# Patient Record
Sex: MALE | Race: WHITE | NOT HISPANIC OR LATINO | Employment: OTHER | URBAN - METROPOLITAN AREA
[De-identification: names, ages, dates, MRNs, and addresses within clinical notes are randomized per-mention and may not be internally consistent; named-entity substitution may affect disease eponyms.]

---

## 2020-03-09 ENCOUNTER — OFFICE VISIT (OUTPATIENT)
Dept: DERMATOLOGY | Facility: CLINIC | Age: 85
End: 2020-03-09
Payer: COMMERCIAL

## 2020-03-09 VITALS
HEART RATE: 86 BPM | OXYGEN SATURATION: 98 % | SYSTOLIC BLOOD PRESSURE: 171 MMHG | DIASTOLIC BLOOD PRESSURE: 94 MMHG | RESPIRATION RATE: 16 BRPM

## 2020-03-09 DIAGNOSIS — L81.4 LENTIGO: ICD-10-CM

## 2020-03-09 DIAGNOSIS — M67.449 DIGITAL MUCOUS CYST: ICD-10-CM

## 2020-03-09 DIAGNOSIS — L82.1 SEBORRHEIC KERATOSIS: Primary | ICD-10-CM

## 2020-03-09 PROCEDURE — 99203 OFFICE O/P NEW LOW 30 MIN: CPT | Performed by: DERMATOLOGY

## 2020-03-09 RX ORDER — LEVOCETIRIZINE DIHYDROCHLORIDE 5 MG/1
5 TABLET, FILM COATED ORAL DAILY
COMMUNITY
Start: 2018-08-29

## 2020-03-09 NOTE — NURSING NOTE
Initial BP (!) 171/94 (BP Location: Right arm, Patient Position: Sitting, Cuff Size: Adult Large)   Pulse 86   Resp 16   SpO2 98%  There is no height or weight on file to calculate BMI. .    Licha Ortega, CMA

## 2020-03-09 NOTE — LETTER
3/9/2020         RE: Tom Summers  [address] [place] [address]        Dear Colleague,    Thank you for referring your patient, Tom Summers, to the [facility]. Please see a copy of my visit note below.    Tom Summers is a 85 year old year old male patient here today for spots on arms and spot on right middle finger.   .  Patient states this has been present for a while.  Patient reports the following symptoms:  drianing clear fluid now gone.  .  Patient reports the following previous treatments none.  These treatments did not work.  Patient reports the following modifying factors none.  Associated symptoms: none.  Patient has no other skin complaints today.  Remainder of the HPI, Meds, PMH, Allergies, FH, and SH was reviewed in chart.    No past medical history on file.    No past surgical history on file.     No family history on file.    Social History     Socioeconomic History     Marital status:      Spouse name: Not on file     Number of children: Not on file     Years of education: Not on file     Highest education level: Not on file   Occupational History     Not on file   Social Needs     Financial resource strain: Not on file     Food insecurity     Worry: Not on file     Inability: Not on file     Transportation needs     Medical: Not on file     Non-medical: Not on file   Tobacco Use     Smoking status: Never Smoker     Smokeless tobacco: Never Used   Substance and Sexual Activity     Alcohol use: Not on file     Drug use: Not on file     Sexual activity: Not on file   Lifestyle     Physical activity     Days per week: Not on file     Minutes per session: Not on file     Stress: Not on file   Relationships     Social connections     Talks on phone: Not on file     Gets together: Not on file     Attends [employer] service: Not on file     Active member of club or organization: Not on file     Attends meetings of clubs or organizations: Not on file      Relationship status: Not on file     Intimate partner violence     Fear of current or ex partner: Not on file     Emotionally abused: Not on file     Physically abused: Not on file     Forced sexual activity: Not on file   Other Topics Concern     Not on file   Social History Narrative     Not on file       Outpatient Encounter Medications as of 3/9/2020   Medication Sig Dispense Refill     levocetirizine (XYZAL) 5 MG tablet Take 5 mg by mouth       No facility-administered encounter medications on file as of 3/9/2020.              Review Of Systems  Skin: As above  Eyes: negative  Ears/Nose/Throat: negative  Respiratory: No shortness of breath, dyspnea on exertion, cough, or hemoptysis  Cardiovascular: negative  Gastrointestinal: negative  Genitourinary: negative  Musculoskeletal: negative  Neurologic: negative  Psychiatric: negative  Hematologic/Lymphatic/Immunologic: negative  Endocrine: negative      O:   NAD, WDWN, Alert & Oriented, Mood & Affect wnl, Vitals stable   Here today alone   BP (!) 171/94 (BP Location: Right arm, Patient Position: Sitting, Cuff Size: Adult Large)   Pulse 86   Resp 16   SpO2 98%    General appearance normal   Vitals stable   Alert, oriented and in no acute distress        Stuck on papules and brown macules on trunk and ext and arms   R middle finger no visable lesions today        The remainder of expanded problem focused exam was normal ; the following areas were examined:  scalp/hair, conjunctiva/lids, face, neck, lips/teeth, chest, digits/nails, RUE, LUE.      Eyes: Conjunctivae/lids:Normal     ENT: Lips, buccal mucosa, tongue: normal    MSK:Normal    Cardiovascular: peripheral edema none    Pulm: Breathing Normal    Neuro/Psych: Orientation:Alert and Orientedx3 ; Mood/Affect:normal       A/P:  1. Seborrheic keratosis, lentigo,   2. Favor digital mucous cyst  Pathophysiology discussed with pateint and information provided   If recurs return to clinic for excision     BENIGN  LESIONS DISCUSSED WITH PATIENT:  I discussed the specifics of tumor, prognosis, and genetics of benign lesions.  I explained that treatment of these lesions would be purely cosmetic and not medically neccessary.  I discussed with patient different removal options including excision, cautery and /or laser.      Nature and genetics of benign skin lesions dicussed with patient.  Signs and Symptoms of skin cancer discussed with patient.  Patient encouraged to perform monthly skin exams.  UV precautions reviewed with patient.  Patient to follow up with Primary Care provider regarding elevated blood pressure.  Skin care regimen reviewed with patient: Eliminate harsh soaps, i.e. Dial, zest, irsih spring; Mild soaps such as Cetaphil or Dove sensitive skin, avoid hot or cold showers, aggressive use of emollients including vanicream, cetaphil or cerave discussed with patient.    Risks of non-melanoma skin cancer discussed with patient   Return to clinic as needed      Again, thank you for allowing me to participate in the care of your patient.        Sincerely,        Jose L Berkowitz MD

## 2020-03-09 NOTE — PROGRESS NOTES
Tom Summers is a 85 year old year old male patient here today for spots on arms and spot on right middle finger.   .  Patient states this has been present for a while.  Patient reports the following symptoms:  drianing clear fluid now gone.  .  Patient reports the following previous treatments none.  These treatments did not work.  Patient reports the following modifying factors none.  Associated symptoms: none.  Patient has no other skin complaints today.  Remainder of the HPI, Meds, PMH, Allergies, FH, and SH was reviewed in chart.    No past medical history on file.    No past surgical history on file.     No family history on file.    Social History     Socioeconomic History     Marital status:      Spouse name: Not on file     Number of children: Not on file     Years of education: Not on file     Highest education level: Not on file   Occupational History     Not on file   Social Needs     Financial resource strain: Not on file     Food insecurity     Worry: Not on file     Inability: Not on file     Transportation needs     Medical: Not on file     Non-medical: Not on file   Tobacco Use     Smoking status: Never Smoker     Smokeless tobacco: Never Used   Substance and Sexual Activity     Alcohol use: Not on file     Drug use: Not on file     Sexual activity: Not on file   Lifestyle     Physical activity     Days per week: Not on file     Minutes per session: Not on file     Stress: Not on file   Relationships     Social connections     Talks on phone: Not on file     Gets together: Not on file     Attends Jehovah's witness service: Not on file     Active member of club or organization: Not on file     Attends meetings of clubs or organizations: Not on file     Relationship status: Not on file     Intimate partner violence     Fear of current or ex partner: Not on file     Emotionally abused: Not on file     Physically abused: Not on file     Forced sexual activity: Not on file   Other Topics Concern      Not on file   Social History Narrative     Not on file       Outpatient Encounter Medications as of 3/9/2020   Medication Sig Dispense Refill     levocetirizine (XYZAL) 5 MG tablet Take 5 mg by mouth       No facility-administered encounter medications on file as of 3/9/2020.              Review Of Systems  Skin: As above  Eyes: negative  Ears/Nose/Throat: negative  Respiratory: No shortness of breath, dyspnea on exertion, cough, or hemoptysis  Cardiovascular: negative  Gastrointestinal: negative  Genitourinary: negative  Musculoskeletal: negative  Neurologic: negative  Psychiatric: negative  Hematologic/Lymphatic/Immunologic: negative  Endocrine: negative      O:   NAD, WDWN, Alert & Oriented, Mood & Affect wnl, Vitals stable   Here today alone   BP (!) 171/94 (BP Location: Right arm, Patient Position: Sitting, Cuff Size: Adult Large)   Pulse 86   Resp 16   SpO2 98%    General appearance normal   Vitals stable   Alert, oriented and in no acute distress        Stuck on papules and brown macules on trunk and ext and arms   R middle finger no visable lesions today        The remainder of expanded problem focused exam was normal ; the following areas were examined:  scalp/hair, conjunctiva/lids, face, neck, lips/teeth, chest, digits/nails, RUE, LUE.      Eyes: Conjunctivae/lids:Normal     ENT: Lips, buccal mucosa, tongue: normal    MSK:Normal    Cardiovascular: peripheral edema none    Pulm: Breathing Normal    Neuro/Psych: Orientation:Alert and Orientedx3 ; Mood/Affect:normal       A/P:  1. Seborrheic keratosis, lentigo,   2. Favor digital mucous cyst  Pathophysiology discussed with pateint and information provided   If recurs return to clinic for excision     BENIGN LESIONS DISCUSSED WITH PATIENT:  I discussed the specifics of tumor, prognosis, and genetics of benign lesions.  I explained that treatment of these lesions would be purely cosmetic and not medically neccessary.  I discussed with patient different  removal options including excision, cautery and /or laser.      Nature and genetics of benign skin lesions dicussed with patient.  Signs and Symptoms of skin cancer discussed with patient.  Patient encouraged to perform monthly skin exams.  UV precautions reviewed with patient.  Patient to follow up with Primary Care provider regarding elevated blood pressure.  Skin care regimen reviewed with patient: Eliminate harsh soaps, i.e. Dial, zest, irsih spring; Mild soaps such as Cetaphil or Dove sensitive skin, avoid hot or cold showers, aggressive use of emollients including vanicream, cetaphil or cerave discussed with patient.    Risks of non-melanoma skin cancer discussed with patient   Return to clinic as needed

## 2022-03-29 ENCOUNTER — TELEPHONE (OUTPATIENT)
Dept: DERMATOLOGY | Facility: CLINIC | Age: 87
End: 2022-03-29
Payer: COMMERCIAL

## 2022-04-04 ENCOUNTER — OFFICE VISIT (OUTPATIENT)
Dept: DERMATOLOGY | Facility: CLINIC | Age: 87
End: 2022-04-04
Payer: COMMERCIAL

## 2022-04-04 VITALS — SYSTOLIC BLOOD PRESSURE: 161 MMHG | DIASTOLIC BLOOD PRESSURE: 94 MMHG | HEART RATE: 74 BPM | OXYGEN SATURATION: 98 %

## 2022-04-04 DIAGNOSIS — Z85.828 HISTORY OF SKIN CANCER: ICD-10-CM

## 2022-04-04 DIAGNOSIS — D23.9 DERMAL NEVUS: ICD-10-CM

## 2022-04-04 DIAGNOSIS — L57.0 AK (ACTINIC KERATOSIS): Primary | ICD-10-CM

## 2022-04-04 DIAGNOSIS — L81.4 LENTIGO: ICD-10-CM

## 2022-04-04 DIAGNOSIS — C44.329 SQUAMOUS CELL CARCINOMA OF FOREHEAD: ICD-10-CM

## 2022-04-04 DIAGNOSIS — D18.01 ANGIOMA OF SKIN: ICD-10-CM

## 2022-04-04 DIAGNOSIS — L82.1 SEBORRHEIC KERATOSIS: ICD-10-CM

## 2022-04-04 PROCEDURE — 99214 OFFICE O/P EST MOD 30 MIN: CPT | Mod: 25 | Performed by: DERMATOLOGY

## 2022-04-04 PROCEDURE — 17312 MOHS ADDL STAGE: CPT | Performed by: DERMATOLOGY

## 2022-04-04 PROCEDURE — 13132 CMPLX RPR F/C/C/M/N/AX/G/H/F: CPT | Mod: 59 | Performed by: DERMATOLOGY

## 2022-04-04 PROCEDURE — 17311 MOHS 1 STAGE H/N/HF/G: CPT | Performed by: DERMATOLOGY

## 2022-04-04 PROCEDURE — 17000 DESTRUCT PREMALG LESION: CPT | Mod: 51 | Performed by: DERMATOLOGY

## 2022-04-04 NOTE — NURSING NOTE
Chief Complaint   Patient presents with     Derm Problem     Moh- Forehead       Vitals:    04/04/22 0739   BP: (!) 161/94   BP Location: Right arm   Patient Position: Sitting   Cuff Size: Adult Large   Pulse: 74   SpO2: 98%     Wt Readings from Last 1 Encounters:   No data found for Wt       Meagan Kamara LPN .................4/4/2022

## 2022-04-04 NOTE — PROGRESS NOTES
Tom Summers , a 87 year old year old male patient, I was asked to see by Dr. 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  Today he notes spot on mid forehead.  Patient states this has been present for a while.  Patient reports the following symptoms:  scale .  Patient reports the following previous treatments none.  Patient reports the following modifying factors none.  Associated symptoms: none.  Patient has no other skin complaints today.  Remainder of the HPI, Meds, PMH, Allergies, FH, and SH was reviewed in chart.    No past medical history on file.    No past surgical history on file.     No family history on file.    Social History     Socioeconomic History     Marital status:      Spouse name: Not on file     Number of children: Not on file     Years of education: Not on file     Highest education level: Not on file   Occupational History     Not on file   Tobacco Use     Smoking status: Never Smoker     Smokeless tobacco: Never Used   Substance and Sexual Activity     Alcohol use: Not on file     Drug use: Not on file     Sexual activity: Not on file   Other Topics Concern     Not on file   Social History Narrative     Not on file     Social Determinants of Health     Financial Resource Strain: Not on file   Food Insecurity: Not on file   Transportation Needs: Not on file   Physical Activity: Not on file   Stress: Not on file   Social Connections: Not on file   Intimate Partner Violence: Not on file   Housing Stability: Not on file       Outpatient Encounter Medications as of 4/4/2022   Medication Sig Dispense Refill     levocetirizine (XYZAL) 5 MG tablet Take 5 mg by mouth       No facility-administered encounter medications on file as of 4/4/2022.             Review Of Systems  Skin: As above  Eyes: negative  Ears/Nose/Throat: negative  Respiratory: No shortness of breath, dyspnea on exertion, cough, or hemoptysis  Cardiovascular: negative  Gastrointestinal: negative  Genitourinary:  negative  Musculoskeletal: negative  Neurologic: negative  Psychiatric: negative  Hematologic/Lymphatic/Immunologic: negative  Endocrine: negative      O:   NAD, WDWN, Alert & Oriented, Mood & Affect wnl, Vitals stable   Here today alone   BP (!) 161/94 (BP Location: Right arm, Patient Position: Sitting, Cuff Size: Adult Large)   Pulse 74   SpO2 98%    General appearance ayo ii   Vitals stable   Alert, oriented and in no acute distress      Following lymph nodes palpated: Occipital, Cervical, Supraclavicular no lad  R forehead 1.2cm red plaque  Mid forehead gritty scaly papule    Stuck on papules and brown macules on trunk and ext    Red papules on trunk   Flesh colored papules on trunk          Eyes: Conjunctivae/lids:Normal     ENT: Lips, buccal mucosa, tongue: normal    MSK:Normal    Cardiovascular: peripheral edema none    Pulm: Breathing Normal    Lymph Nodes: No Head and Neck Lymphadenopathy     Neuro/Psych: Orientation:Normal; Mood/Affect:Normal      A/P:  1. Seborrheic keratosis, lentigo, angioma, dermal nevus  2. Actinic keratosis forehead  LN2:  Treated with LN2 for 5s for 1-2 cycles. Warned risks of blistering, pain, pigment change, scarring, and incomplete resolution.  Advised patient to return if lesions do not completely resolve.  Wound care sheet given.  3. Hx of non-melanoma skin cancer   4. R forehead squamous cell carcinoma   It was a pleasure speaking to Tom Summers today.  Previous clinic  notes and pertinent laboratory tests were reviewed prior to Tom Summers's visit.  Nature and genetics of benign skin lesions dicussed with patient.  Signs and Symptoms of skin cancer discussed with patient.  Patient encouraged to perform monthly skin exams.  UV precautions reviewed with patient.  Risks of non-melanoma skin cancer discussed with patient   Return to clinic 6 months  PROCEDURE NOTE  R forehead squamous cell carcinoma   MOHS:   Location    The rationale for Mohs surgery was discussed  with the patient and consent was obtained.  The risks and benefits as well as alternatives to therapy were discussed, in detail.  Specifically, the risks of infection, scarring, bleeding, prolonged wound healing, incomplete removal, allergy to anesthesia, nerve injury and recurrence were addressed.  Indication for Mohs was Location. Prior to the procedure, the treatment site was clearly identified and, if available, confirmed with previous photos and confirmed by the patient   All components of the Universal Protocol/PAUSE rule were completed.  The Mohs surgeon operated in two distinct and integrated capacities as the surgeon and pathologist.      The area was prepped with Betasept.  A rim of normal appearing skin was marked circumferentially around the lesion.  The area was infiltrated with local anesthesia.  The tumor was first debulked to remove all clinically apparent tumor.  An incision following the standard Mohs approach was done and the specimen was oriented,mapped and placed in 2 block(s).  Each specimen was then chromacoded and processed in the Mohs laboratory using standard Mohs technique and submitted for frozen section histology.  Frozen section analysis showed  residual tumor but CLEAR MARGINS.    First stage:There is a proliferation of irregular nests of abnormal squamous cells arising from the epidermis and invading the dermis. These are well differentiated. The dermis shows a variable superficial perivascular inflammatory infiltrate.     The tumor was excised using standard Mohs technique in 2 stages(s).  CLEAR MARGINS OBTAINED and Final defect size was 2cm.     We discussed the options for wound management in full with the patient including risks/benefits/ possible outcomes.        REPAIR COMPLEX: Because of the tightness of the surrounding skin, superficial temporal artery and bone, ,  and Because of the size and full thickness nature of the defect, Because of the tightness of the surrounding skin  and To maintain form and function, a complex closure was planned. After LE anesthesia and prep, Burow's triangles were excised in the relaxed skin tension lines. The wound edges were widely undermined greater than width of the defect on both sides by dissection in the subcutaneous plane until adequate tissue mobility was obtained. Hemostasis was obtained. The wound edges were closed in a layered fashion using Vicryl and Fast Absorbing Plain Gut sutures. Postoperative length was 5 cm.   EBL minimal; complications none; wound care routine.  The patient was discharged in good condition and will return in one week for wound evaluation.

## 2022-04-04 NOTE — LETTER
4/4/2022         RE: Tom Summers  [address] [place] [address]        Dear Colleague,    Thank you for referring your patient, Tom Summers, to the [facility]. Please see a copy of my visit note below.    Surgical Office Location :   [facility]  [address] [place] [address]      Tom Summers , a 87 year old year old male patient, I was asked to see by Dr. 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  Today he notes spot on mid forehead.  Patient states this has been present for a while.  Patient reports the following symptoms:  scale .  Patient reports the following previous treatments none.  Patient reports the following modifying factors none.  Associated symptoms: none.  Patient has no other skin complaints today.  Remainder of the HPI, Meds, PMH, Allergies, FH, and SH was reviewed in chart.    No past medical history on file.    No past surgical history on file.     No family history on file.    Social History     Socioeconomic History     Marital status:      Spouse name: Not on file     Number of children: Not on file     Years of education: Not on file     Highest education level: Not on file   Occupational History     Not on file   Tobacco Use     Smoking status: Never Smoker     Smokeless tobacco: Never Used   Substance and Sexual Activity     Alcohol use: Not on file     Drug use: Not on file     Sexual activity: Not on file   Other Topics Concern     Not on file   Social History Narrative     Not on file     Social Determinants of Health     Financial Resource Strain: Not on file   Food Insecurity: Not on file   Transportation Needs: Not on file   Physical Activity: Not on file   Stress: Not on file   Social Connections: Not on file   Intimate Partner Violence: Not on file   Housing Stability: Not on file       Outpatient Encounter Medications as of 4/4/2022   Medication Sig Dispense Refill     levocetirizine  (XYZAL) 5 MG tablet Take 5 mg by mouth       No facility-administered encounter medications on file as of 4/4/2022.             Review Of Systems  Skin: As above  Eyes: negative  Ears/Nose/Throat: negative  Respiratory: No shortness of breath, dyspnea on exertion, cough, or hemoptysis  Cardiovascular: negative  Gastrointestinal: negative  Genitourinary: negative  Musculoskeletal: negative  Neurologic: negative  Psychiatric: negative  Hematologic/Lymphatic/Immunologic: negative  Endocrine: negative      O:   NAD, WDWN, Alert & Oriented, Mood & Affect wnl, Vitals stable   Here today alone   BP (!) 161/94 (BP Location: Right arm, Patient Position: Sitting, Cuff Size: Adult Large)   Pulse 74   SpO2 98%    General appearance ayo ii   Vitals stable   Alert, oriented and in no acute distress      Following lymph nodes palpated: Occipital, Cervical, Supraclavicular no lad  R forehead 1.2cm red plaque  Mid forehead gritty scaly papule    Stuck on papules and brown macules on trunk and ext    Red papules on trunk   Flesh colored papules on trunk          Eyes: Conjunctivae/lids:Normal     ENT: Lips, buccal mucosa, tongue: normal    MSK:Normal    Cardiovascular: peripheral edema none    Pulm: Breathing Normal    Lymph Nodes: No Head and Neck Lymphadenopathy     Neuro/Psych: Orientation:Normal; Mood/Affect:Normal      A/P:  1. Seborrheic keratosis, lentigo, angioma, dermal nevus  2. Actinic keratosis forehead  LN2:  Treated with LN2 for 5s for 1-2 cycles. Warned risks of blistering, pain, pigment change, scarring, and incomplete resolution.  Advised patient to return if lesions do not completely resolve.  Wound care sheet given.  3. Hx of non-melanoma skin cancer   4. R forehead squamous cell carcinoma   It was a pleasure speaking to Tom Summers today.  Previous clinic  notes and pertinent laboratory tests were reviewed prior to Tom Summers's visit.  Nature and genetics of benign skin lesions dicussed with  patient.  Signs and Symptoms of skin cancer discussed with patient.  Patient encouraged to perform monthly skin exams.  UV precautions reviewed with patient.  Risks of non-melanoma skin cancer discussed with patient   Return to clinic 6 months  PROCEDURE NOTE  R forehead squamous cell carcinoma   MOHS:   Location    The rationale for Mohs surgery was discussed with the patient and consent was obtained.  The risks and benefits as well as alternatives to therapy were discussed, in detail.  Specifically, the risks of infection, scarring, bleeding, prolonged wound healing, incomplete removal, allergy to anesthesia, nerve injury and recurrence were addressed.  Indication for Mohs was Location. Prior to the procedure, the treatment site was clearly identified and, if available, confirmed with previous photos and confirmed by the patient   All components of the Universal Protocol/PAUSE rule were completed.  The Mohs surgeon operated in two distinct and integrated capacities as the surgeon and pathologist.      The area was prepped with Betasept.  A rim of normal appearing skin was marked circumferentially around the lesion.  The area was infiltrated with local anesthesia.  The tumor was first debulked to remove all clinically apparent tumor.  An incision following the standard Mohs approach was done and the specimen was oriented,mapped and placed in 2 block(s).  Each specimen was then chromacoded and processed in the Mohs laboratory using standard Mohs technique and submitted for frozen section histology.  Frozen section analysis showed  residual tumor but CLEAR MARGINS.    First stage:There is a proliferation of irregular nests of abnormal squamous cells arising from the epidermis and invading the dermis. These are well differentiated. The dermis shows a variable superficial perivascular inflammatory infiltrate.     The tumor was excised using standard Mohs technique in 2 stages(s).  CLEAR MARGINS OBTAINED and Final  defect size was 2cm.     We discussed the options for wound management in full with the patient including risks/benefits/ possible outcomes.        REPAIR COMPLEX: Because of the tightness of the surrounding skin, superficial temporal artery and bone, ,  and Because of the size and full thickness nature of the defect, Because of the tightness of the surrounding skin and To maintain form and function, a complex closure was planned. After LE anesthesia and prep, Burow's triangles were excised in the relaxed skin tension lines. The wound edges were widely undermined greater than width of the defect on both sides by dissection in the subcutaneous plane until adequate tissue mobility was obtained. Hemostasis was obtained. The wound edges were closed in a layered fashion using Vicryl and Fast Absorbing Plain Gut sutures. Postoperative length was 5 cm.   EBL minimal; complications none; wound care routine.  The patient was discharged in good condition and will return in one week for wound evaluation.        Again, thank you for allowing me to participate in the care of your patient.        Sincerely,        Jose L Berkowitz MD

## 2022-04-04 NOTE — PATIENT INSTRUCTIONS
Sutured Wound Care   Forehead    [facility]: [phone]    [facility]: [phone]          ? No strenuous activity for 48 hours. Resume moderate activity in 48 hours. No heavy exercising until you are seen for follow up in one week.     ? Take Tylenol as needed for discomfort.                         ? Do not drink alcoholic beverages for 48 hours.     ? Keep the pressure bandage in place for 24 hours. If the bandage becomes blood tinged or loose, reinforce it with gauze and tape.        (Refer to the reverse side of this page for management of bleeding).    ? Remove pressure bandage in 24 hours     ? Leave the flat bandage in place until your follow up appointment.    ? Keep the bandage dry. Wash around it carefully.    ? If the tape becomes soiled or starts to come off, reinforce it with additional paper tape.    ? Do not smoke for 3 weeks; smoking is detrimental to wound healing.    ? It is normal to have swelling and bruising around the surgical site. The bruising will fade in approximately 10-14 days. Elevate the area to reduce swelling.    ? Numbness, itchiness and sensitivity to temperature changes can occur after surgery and may take up to 18 months to normalize.      POSSIBLE COMPLICATIONS    BLEEDIN. Leave the bandage in place.  2. Use tightly rolled up gauze or a cloth to apply direct pressure over the bandage for 20   minutes.  3. Reapply pressure for an additional 20 minutes if necessary  4. Call the office or go to the nearest emergency room if pressure fails to stop the bleeding.  5. Use additional gauze and tape to maintain pressure once the bleeding has stopped.        PAIN:    1. Post operative pain should slowly get better, never worse.  2. A severe increase in pain may indicate a problem. Call the office if this occurs.    In case of emergency phone:Dr Berkowitz [phone]

## 2022-04-12 ENCOUNTER — OFFICE VISIT (OUTPATIENT)
Dept: DERMATOLOGY | Facility: CLINIC | Age: 87
End: 2022-04-12
Payer: COMMERCIAL

## 2022-04-12 DIAGNOSIS — Z48.00 ATTENTION TO DRESSINGS AND SUTURES: Primary | ICD-10-CM

## 2022-04-12 DIAGNOSIS — Z48.02 ATTENTION TO DRESSINGS AND SUTURES: Primary | ICD-10-CM

## 2022-04-12 PROCEDURE — 99207 PR NO CHARGE NURSE ONLY: CPT

## 2022-04-12 NOTE — PROGRESS NOTES
Pt returned to clinic for post surgery 1 week follow up bandage change. Pt has no complaints, denies pain. Bandage removed on forehead area cleansed with normal saline. Site is healing and wound edges approximating well. Reapplied new steri strips and paper tape.    Advised to watch for signs/sx of infection; spreading redness, drainage, odor, fever. Call or report promptly to clinic. Pt given written instructions and informed to rtc as needed. Patient verbalized understanding.

## 2024-01-29 ENCOUNTER — OFFICE VISIT (OUTPATIENT)
Dept: DERMATOLOGY | Facility: CLINIC | Age: 89
End: 2024-01-29
Payer: COMMERCIAL

## 2024-01-29 DIAGNOSIS — L82.1 SEBORRHEIC KERATOSES: ICD-10-CM

## 2024-01-29 DIAGNOSIS — D23.9 DERMAL NEVUS: Primary | ICD-10-CM

## 2024-01-29 DIAGNOSIS — D18.01 ANGIOMA OF SKIN: ICD-10-CM

## 2024-01-29 DIAGNOSIS — Z85.828 HISTORY OF SKIN CANCER: ICD-10-CM

## 2024-01-29 DIAGNOSIS — D48.5 NEOPLASM OF UNCERTAIN BEHAVIOR OF SKIN: ICD-10-CM

## 2024-01-29 DIAGNOSIS — L81.4 LENTIGO: ICD-10-CM

## 2024-01-29 PROCEDURE — 11102 TANGNTL BX SKIN SINGLE LES: CPT | Performed by: DERMATOLOGY

## 2024-01-29 PROCEDURE — 88341 IMHCHEM/IMCYTCHM EA ADD ANTB: CPT | Performed by: DERMATOLOGY

## 2024-01-29 PROCEDURE — 99213 OFFICE O/P EST LOW 20 MIN: CPT | Mod: 25 | Performed by: DERMATOLOGY

## 2024-01-29 PROCEDURE — 88342 IMHCHEM/IMCYTCHM 1ST ANTB: CPT | Performed by: DERMATOLOGY

## 2024-01-29 PROCEDURE — 88305 TISSUE EXAM BY PATHOLOGIST: CPT | Performed by: DERMATOLOGY

## 2024-01-29 NOTE — PROGRESS NOTES
Tom Summers is an extremely pleasant 88 year old year old male patient here today for growth on right cheek.   Patient has no other skin complaints today.  Remainder of the HPI, Meds, PMH, Allergies, FH, and SH was reviewed in chart.      Past Medical History:   Diagnosis Date    Squamous cell carcinoma of skin, unspecified        History reviewed. No pertinent surgical history.     History reviewed. No pertinent family history.    Social History     Socioeconomic History    Marital status:      Spouse name: Not on file    Number of children: Not on file    Years of education: Not on file    Highest education level: Not on file   Occupational History    Not on file   Tobacco Use    Smoking status: Never    Smokeless tobacco: Never   Substance and Sexual Activity    Alcohol use: Not on file    Drug use: Not on file    Sexual activity: Not on file   Other Topics Concern    Not on file   Social History Narrative    Not on file     Social Determinants of Health     Financial Resource Strain: Not on file   Food Insecurity: Not on file   Transportation Needs: Not on file   Physical Activity: Not on file   Stress: Not on file   Social Connections: Not on file   Interpersonal Safety: Not on file   Housing Stability: Not on file       Outpatient Encounter Medications as of 1/29/2024   Medication Sig Dispense Refill    levocetirizine (XYZAL) 5 MG tablet Take 5 mg by mouth       No facility-administered encounter medications on file as of 1/29/2024.             O:   NAD, WDWN, Alert & Oriented, Mood & Affect wnl, Vitals stable   General appearance normal   Vitals stable   Alert, oriented and in no acute distress     R sideburn 9mm scaly papule    Stuck on papules and brown macules on trunk and ext   Red papules on trunk  Flesh colored papules on trunk         Eyes: Conjunctivae/lids:Normal     ENT: Lips, buccal mucosa, tongue: normal    MSK:Normal    Cardiovascular: peripheral edema none    Pulm: Breathing  Normal    Lymph Nodes: No Head and Neck Lymphadenopathy     Neuro/Psych: Orientation:Alert and Orientedx3 ; Mood/Affect:normal       A/P:  1. Seborrheic keratosis, lentigo, angioma, dermal nevus, hx of non-melanoma skin cancer   2. R sideburn r/o squamous cell carcinoma   TANGENTIAL BIOPSY SENT OUT:  After consent, anesthesia with LEC and prep, tangential excision performed and specimen sent out for permanent section histology.  No complications and routine wound care. Patient told to call our office in 1-2 weeks for result.         It was a pleasure speaking to Tom Summers today.  Previous clinic notes and pertinent laboratory tests were reviewed prior to Tom Summers's visit.  Signs and Symptoms of skin cancer discussed with patient.  Patient encouraged to perform monthly skin exams.  UV precautions reviewed with patient.  Risks of non-melanoma skin cancer discussed with patient   Return to clinic next appt

## 2024-01-29 NOTE — PATIENT INSTRUCTIONS
Wound Care Instructions     FOR SUPERFICIAL WOUNDS     [facility] [phone]    [facility] [phone]                       AFTER 24 HOURS YOU SHOULD REMOVE THE BANDAGE AND BEGIN DAILY DRESSING CHANGES AS FOLLOWS:     1) Remove Dressing.     2) Clean and dry the area with tap water using a Q-tip or sterile gauze pad.     3) Apply Vaseline, Aquaphor, Polysporin ointment or Bacitracin ointment over entire wound.  Do NOT use Neosporin ointment.     4) Cover the wound with a band-aid, or a sterile non-stick gauze pad and micropore paper tape      REPEAT THESE INSTRUCTIONS AT LEAST ONCE A DAY UNTIL THE WOUND HAS COMPLETELY HEALED.    It is an old wives tale that a wound heals better when it is exposed to air and allowed to dry out. The wound will heal faster with a better cosmetic result if it is kept moist with ointment and covered with a bandage.    **Do not let the wound dry out.**      Supplies Needed:      *Cotton tipped applicators (Q-tips)    *Polysporin Ointment or Bacitracin Ointment (NOT NEOSPORIN)    *Band-aids or non-stick gauze pads and micropore paper tape.      PATIENT INFORMATION:    During the healing process you will notice a number of changes. All wounds develop a small halo of redness surrounding the wound.  This means healing is occurring. Severe itching with extensive redness usually indicates sensitivity to the ointment or bandage tape used to dress the wound.  You should call our office if this develops.      Swelling  and/or discoloration around your surgical site is common, particularly when performed around the eye.    All wounds normally drain.  The larger the wound the more drainage there will be.  After 7-10 days, you will notice the wound beginning to shrink and new skin will begin to grow.  The wound is healed when you can see skin has formed over the entire area.  A healed wound has a healthy, shiny look to the surface and is red to dark pink in color  to normalize.  Wounds may take approximately 4-6 weeks to heal.  Larger wounds may take 6-8 weeks.  After the wound is healed you may discontinue dressing changes.    You may experience a sensation of tightness as your wound heals. This is normal and will gradually subside.    Your healed wound may be sensitive to temperature changes. This sensitivity improves with time, but if you re having a lot of discomfort, try to avoid temperature extremes.    Patients frequently experience itching after their wound appears to have healed because of the continue healing under the skin.  Plain Vaseline will help relieve the itching.        POSSIBLE COMPLICATIONS    BLEEDING:    Leave the bandage in place.  Use tightly rolled up gauze or a cloth to apply direct pressure over the bandage for 30  minutes.  Reapply pressure for an additional 30 minutes if necessary  Use additional gauze and tape to maintain pressure once the bleeding has stopped.

## 2024-01-29 NOTE — LETTER
1/29/2024         RE: Tom Summers  [address] [place] [address]        Dear Colleague,    Thank you for referring your patient, Tom Summers, to the [facility]. Please see a copy of my visit note below.    Tom Summers is an extremely pleasant 88 year old year old male patient here today for growth on right cheek.   Patient has no other skin complaints today.  Remainder of the HPI, Meds, PMH, Allergies, FH, and SH was reviewed in chart.      Past Medical History:   Diagnosis Date     Squamous cell carcinoma of skin, unspecified        History reviewed. No pertinent surgical history.     History reviewed. No pertinent family history.    Social History     Socioeconomic History     Marital status:      Spouse name: Not on file     Number of children: Not on file     Years of education: Not on file     Highest education level: Not on file   Occupational History     Not on file   Tobacco Use     Smoking status: Never     Smokeless tobacco: Never   Substance and Sexual Activity     Alcohol use: Not on file     Drug use: Not on file     Sexual activity: Not on file   Other Topics Concern     Not on file   Social History Narrative     Not on file     Social Determinants of Health     Financial Resource Strain: Not on file   Food Insecurity: Not on file   Transportation Needs: Not on file   Physical Activity: Not on file   Stress: Not on file   Social Connections: Not on file   Interpersonal Safety: Not on file   Housing Stability: Not on file       Outpatient Encounter Medications as of 1/29/2024   Medication Sig Dispense Refill     levocetirizine (XYZAL) 5 MG tablet Take 5 mg by mouth       No facility-administered encounter medications on file as of 1/29/2024.             O:   NAD, WDWN, Alert & Oriented, Mood & Affect wnl, Vitals stable   General appearance normal   Vitals stable   Alert, oriented and in no acute distress     R sideburn 9mm scaly papule    Stuck  on papules and brown macules on trunk and ext   Red papules on trunk  Flesh colored papules on trunk         Eyes: Conjunctivae/lids:Normal     ENT: Lips, buccal mucosa, tongue: normal    MSK:Normal    Cardiovascular: peripheral edema none    Pulm: Breathing Normal    Lymph Nodes: No Head and Neck Lymphadenopathy     Neuro/Psych: Orientation:Alert and Orientedx3 ; Mood/Affect:normal       A/P:  1. Seborrheic keratosis, lentigo, angioma, dermal nevus, hx of non-melanoma skin cancer   2. R sideburn r/o squamous cell carcinoma   TANGENTIAL BIOPSY SENT OUT:  After consent, anesthesia with LEC and prep, tangential excision performed and specimen sent out for permanent section histology.  No complications and routine wound care. Patient told to call our office in 1-2 weeks for result.         It was a pleasure speaking to Tom Summers today.  Previous clinic notes and pertinent laboratory tests were reviewed prior to Tom Summers's visit.  Signs and Symptoms of skin cancer discussed with patient.  Patient encouraged to perform monthly skin exams.  UV precautions reviewed with patient.  Risks of non-melanoma skin cancer discussed with patient   Return to clinic next appt      Again, thank you for allowing me to participate in the care of your patient.        Sincerely,        Jose L Berkowitz MD

## 2024-02-05 ENCOUNTER — TELEPHONE (OUTPATIENT)
Dept: DERMATOLOGY | Facility: CLINIC | Age: 89
End: 2024-02-05
Payer: COMMERCIAL

## 2024-02-05 LAB
PATH REPORT.COMMENTS IMP SPEC: ABNORMAL
PATH REPORT.COMMENTS IMP SPEC: YES
PATH REPORT.FINAL DX SPEC: ABNORMAL
PATH REPORT.GROSS SPEC: ABNORMAL
PATH REPORT.MICROSCOPIC SPEC OTHER STN: ABNORMAL
PATH REPORT.RELEVANT HX SPEC: ABNORMAL

## 2024-02-05 NOTE — TELEPHONE ENCOUNTER
----- Message from Jose L Berkowitz MD sent at 2/5/2024 12:25 PM CST -----  A(1). 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

## 2024-02-05 NOTE — TELEPHONE ENCOUNTER
Patient Contact    Attempt # 1    Was call answered?  No  Left message on answering machine for patient to call back.    Thank you,    Claire ROWERN BSN  [facility]- [phone]

## 2024-02-05 NOTE — TELEPHONE ENCOUNTER
Called patient:  Patient notified and educated on test results   Mohs procedure explained- all questions answered  appointment scheduled-   Thank you,    Claire ROWERN BSN  [facility]  [phone]

## 2024-02-06 ENCOUNTER — OFFICE VISIT (OUTPATIENT)
Dept: DERMATOLOGY | Facility: CLINIC | Age: 89
End: 2024-02-06
Payer: COMMERCIAL

## 2024-02-06 DIAGNOSIS — C44.329 SQUAMOUS CELL CANCER OF SKIN OF SIDEBURN: Primary | ICD-10-CM

## 2024-02-06 PROCEDURE — 17311 MOHS 1 STAGE H/N/HF/G: CPT | Performed by: DERMATOLOGY

## 2024-02-06 PROCEDURE — 13132 CMPLX RPR F/C/C/M/N/AX/G/H/F: CPT | Performed by: DERMATOLOGY

## 2024-02-06 RX ORDER — FUROSEMIDE 20 MG
1 TABLET ORAL EVERY MORNING
Status: ON HOLD | COMMUNITY
Start: 2023-08-15 | End: 2024-04-18

## 2024-02-06 RX ORDER — FLUTICASONE PROPIONATE 50 MCG
1 SPRAY, SUSPENSION (ML) NASAL
Status: ON HOLD | COMMUNITY
Start: 2022-02-24 | End: 2024-04-18

## 2024-02-06 RX ORDER — BEVACIZUMAB 2MG/0.08ML
SYRINGE (ML) INTRAOCULAR
Status: ON HOLD | COMMUNITY
Start: 2023-09-07 | End: 2024-04-18

## 2024-02-06 NOTE — NURSING NOTE
Tom Summers's chief complaint for this visit includes:  Chief Complaint   Patient presents with    Derm Problem     Mohs- right temple       PCP: No Ref-Primary, Physician    Referring Provider:  No referring provider defined for this encounter.    There were no vitals taken for this visit.  Data Unavailable      No Known Allergies      Do you need any medication refills at today's visit? No    Kylee Guerra MA

## 2024-02-06 NOTE — PROGRESS NOTES
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  Patient has no other skin complaints today.  Remainder of the HPI, Meds, PMH, Allergies, FH, and SH was reviewed in chart.      Past Medical History:   Diagnosis Date    Squamous cell carcinoma of skin, unspecified        History reviewed. No pertinent surgical history.     History reviewed. No pertinent family history.    Social History     Socioeconomic History    Marital status:      Spouse name: Not on file    Number of children: Not on file    Years of education: Not on file    Highest education level: Not on file   Occupational History    Not on file   Tobacco Use    Smoking status: Never    Smokeless tobacco: Never   Substance and Sexual Activity    Alcohol use: Not on file    Drug use: Not on file    Sexual activity: Not on file   Other Topics Concern    Not on file   Social History Narrative    Not on file     Social Determinants of Health     Financial Resource Strain: Not on file   Food Insecurity: Not on file   Transportation Needs: Not on file   Physical Activity: Not on file   Stress: Not on file   Social Connections: Not on file   Interpersonal Safety: Not on file   Housing Stability: Not on file       Outpatient Encounter Medications as of 2/6/2024   Medication Sig Dispense Refill    Bevacizumab 2.5 MG/0.1ML SOSY       fluticasone (FLONASE) 50 MCG/ACT nasal spray Spray 1 spray in nostril      furosemide (LASIX) 20 MG tablet Take 1 tablet by mouth every morning      levocetirizine (XYZAL) 5 MG tablet Take 5 mg by mouth       No facility-administered encounter medications on file as of 2/6/2024.             O:   NAD, WDWN, Alert & Oriented, Mood & Affect wnl, Vitals stable   General appearance normal   Vitals stable   Alert, oriented and in no acute distress      Following lymph nodes palpated: Occipital, Cervical,  Supraclavicular no lad  R sideburn 9mm crusted scaly papule       Eyes: Conjunctivae/lids:Normal     ENT: Lips, buccal mucosa, tongue: normal    MSK:Normal    Cardiovascular: peripheral edema none    Pulm: Breathing Normal    Lymph Nodes: No Head and Neck Lymphadenopathy     Neuro/Psych: Orientation:Alert and Orientedx3 ; Mood/Affect:normal       A/P:  R sideburn squamous cell carcinoma   MOHS:   Location    The rationale for Mohs surgery was discussed with the patient and consent was obtained.  The risks and benefits as well as alternatives to therapy were discussed, in detail.  Specifically, the risks of infection, scarring, bleeding, prolonged wound healing, incomplete removal, allergy to anesthesia, nerve injury and recurrence were addressed.  Indication for Mohs was Location. Prior to the procedure, the treatment site was clearly identified and, if available, confirmed with previous photos and confirmed by the patient   All components of the Universal Protocol/PAUSE rule were completed.  The Mohs surgeon operated in two distinct and integrated capacities as the surgeon and pathologist.      The area was prepped with Betasept.  A rim of normal appearing skin was marked circumferentially around the lesion.  The area was infiltrated with local anesthesia.  The tumor was first debulked to remove all clinically apparent tumor.  An incision following the standard Mohs approach was done and the specimen was oriented,mapped and placed in 1 block(s).  Each specimen was then chromacoded and processed in the Mohs laboratory using standard Mohs technique and submitted for frozen section histology.  Frozen section analysis showed no residual tumor but CLEAR MARGINS.      The tumor was excised using standard Mohs technique in 1 stages(s).  CLEAR MARGINS OBTAINED and Final defect size was 1.5 cm.     We discussed the options for wound management in full with the patient including risks/benefits/ possible outcomes.      REPAIR  COMPLEX: Because of the tightness of the surrounding skin and Because of the size and full thickness nature of the defect, Because of the tightness of the surrounding skin, To maintain form and function, In order to avoid distortion, and Because of the proximity to the lid, a complex closure was planned. After LE anesthesia and prep, Burow's triangles were excised in the relaxed skin tension lines. The wound edges were widely undermined greater than width of the defect on both sides by dissection in the subcutaneous plane until adequate tissue mobility was obtained. Hemostasis was obtained. The wound edges were closed in a layered fashion using Vicryl and Fast Absorbing Plain Gut sutures. Postoperative length was 4.2 cm.   EBL minimal; complications none; wound care routine.  The patient was discharged in good condition and will return in one week for wound evaluation.  It was a pleasure speaking to Tom Summers today.  Previous clinic notes and pertinent laboratory tests were reviewed prior to Tom Summers's visit.  Signs and Symptoms of skin cancer discussed with patient.  Patient encouraged to perform monthly skin exams.  UV precautions reviewed with patient.  Risks of non-melanoma skin cancer discussed with patient   Return to clinic 4 months

## 2024-02-06 NOTE — PATIENT INSTRUCTIONS
Sutured Wound Care     Right sideburn    [facility]: [phone]    [facility]: [phone]          No strenuous activity for 48 hours. Resume moderate activity in 48 hours. No heavy exercising until you are seen for follow up in one week.     Take Tylenol as needed for discomfort.                         Do not drink alcoholic beverages for 48 hours.     Keep the pressure bandage in place for 24 hours. If the bandage becomes blood tinged or loose, reinforce it with gauze and tape.        (Refer to the reverse side of this page for management of bleeding).    Remove pressure bandage in 24 hours     Leave the flat bandage in place until your follow up appointment.    Keep the bandage dry. Wash around it carefully.    If the tape becomes soiled or starts to come off, reinforce it with additional paper tape.    Do not smoke for 3 weeks; smoking is detrimental to wound healing.    It is normal to have swelling and bruising around the surgical site. The bruising will fade in approximately 10-14 days. Elevate the area to reduce swelling.    Numbness, itchiness and sensitivity to temperature changes can occur after surgery and may take up to 18 months to normalize.      POSSIBLE COMPLICATIONS    BLEEDING:    Leave the bandage in place.  Use tightly rolled up gauze or a cloth to apply direct pressure over the bandage for 20   minutes.  Reapply pressure for an additional 20 minutes if necessary  Call the office or go to the nearest emergency room if pressure fails to stop the bleeding.  Use additional gauze and tape to maintain pressure once the bleeding has stopped.        PAIN:    Post operative pain should slowly get better, never worse.  A severe increase in pain may indicate a problem. Call the office if this occurs.    In case of emergency phone:Dr Berkowitz [phone]       General

## 2024-02-06 NOTE — LETTER
2/6/2024         RE: Tom Summers  [address] [place] [address]        Dear Colleague,    Thank you for referring your patient, Tom Summers, to the [facility]. Please see a copy of my visit note below.    Surgical Office Location :   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  Patient has no other skin complaints today.  Remainder of the HPI, Meds, PMH, Allergies, FH, and SH was reviewed in chart.      Past Medical History:   Diagnosis Date     Squamous cell carcinoma of skin, unspecified        History reviewed. No pertinent surgical history.     History reviewed. No pertinent family history.    Social History     Socioeconomic History     Marital status:      Spouse name: Not on file     Number of children: Not on file     Years of education: Not on file     Highest education level: Not on file   Occupational History     Not on file   Tobacco Use     Smoking status: Never     Smokeless tobacco: Never   Substance and Sexual Activity     Alcohol use: Not on file     Drug use: Not on file     Sexual activity: Not on file   Other Topics Concern     Not on file   Social History Narrative     Not on file     Social Determinants of Health     Financial Resource Strain: Not on file   Food Insecurity: Not on file   Transportation Needs: Not on file   Physical Activity: Not on file   Stress: Not on file   Social Connections: Not on file   Interpersonal Safety: Not on file   Housing Stability: Not on file       Outpatient Encounter Medications as of 2/6/2024   Medication Sig Dispense Refill     Bevacizumab 2.5 MG/0.1ML SOSY        fluticasone (FLONASE) 50 MCG/ACT nasal spray Spray 1 spray in nostril       furosemide (LASIX) 20 MG tablet Take 1 tablet by mouth every  morning       levocetirizine (XYZAL) 5 MG tablet Take 5 mg by mouth       No facility-administered encounter medications on file as of 2/6/2024.             O:   NAD, WDWN, Alert & Oriented, Mood & Affect wnl, Vitals stable   General appearance normal   Vitals stable   Alert, oriented and in no acute distress      Following lymph nodes palpated: Occipital, Cervical, Supraclavicular no lad  R sideburn 9mm crusted scaly papule       Eyes: Conjunctivae/lids:Normal     ENT: Lips, buccal mucosa, tongue: normal    MSK:Normal    Cardiovascular: peripheral edema none    Pulm: Breathing Normal    Lymph Nodes: No Head and Neck Lymphadenopathy     Neuro/Psych: Orientation:Alert and Orientedx3 ; Mood/Affect:normal       A/P:  R sideburn squamous cell carcinoma   MOHS:   Location    The rationale for Mohs surgery was discussed with the patient and consent was obtained.  The risks and benefits as well as alternatives to therapy were discussed, in detail.  Specifically, the risks of infection, scarring, bleeding, prolonged wound healing, incomplete removal, allergy to anesthesia, nerve injury and recurrence were addressed.  Indication for Mohs was Location. Prior to the procedure, the treatment site was clearly identified and, if available, confirmed with previous photos and confirmed by the patient   All components of the Universal Protocol/PAUSE rule were completed.  The Mohs surgeon operated in two distinct and integrated capacities as the surgeon and pathologist.      The area was prepped with Betasept.  A rim of normal appearing skin was marked circumferentially around the lesion.  The area was infiltrated with local anesthesia.  The tumor was first debulked to remove all clinically apparent tumor.  An incision following the standard Mohs approach was done and the specimen was oriented,mapped and placed in 1 block(s).  Each specimen was then chromacoded and processed in the Mohs laboratory using standard Mohs technique and  submitted for frozen section histology.  Frozen section analysis showed no residual tumor but CLEAR MARGINS.      The tumor was excised using standard Mohs technique in 1 stages(s).  CLEAR MARGINS OBTAINED and Final defect size was 1.5 cm.     We discussed the options for wound management in full with the patient including risks/benefits/ possible outcomes.      REPAIR COMPLEX: Because of the tightness of the surrounding skin and Because of the size and full thickness nature of the defect, Because of the tightness of the surrounding skin, To maintain form and function, In order to avoid distortion, and Because of the proximity to the lid, a complex closure was planned. After LE anesthesia and prep, Burow's triangles were excised in the relaxed skin tension lines. The wound edges were widely undermined greater than width of the defect on both sides by dissection in the subcutaneous plane until adequate tissue mobility was obtained. Hemostasis was obtained. The wound edges were closed in a layered fashion using Vicryl and Fast Absorbing Plain Gut sutures. Postoperative length was 4.2 cm.   EBL minimal; complications none; wound care routine.  The patient was discharged in good condition and will return in one week for wound evaluation.  It was a pleasure speaking to Tom Summers today.  Previous clinic notes and pertinent laboratory tests were reviewed prior to Tom Summers's visit.  Signs and Symptoms of skin cancer discussed with patient.  Patient encouraged to perform monthly skin exams.  UV precautions reviewed with patient.  Risks of non-melanoma skin cancer discussed with patient   Return to clinic 4 months        Again, thank you for allowing me to participate in the care of your patient.        Sincerely,        Jose L Berkowitz MD

## 2024-02-13 ENCOUNTER — ALLIED HEALTH/NURSE VISIT (OUTPATIENT)
Dept: DERMATOLOGY | Facility: CLINIC | Age: 89
End: 2024-02-13
Payer: COMMERCIAL

## 2024-02-13 DIAGNOSIS — Z48.01 ENCOUNTER FOR CHANGE OR REMOVAL OF SURGICAL WOUND DRESSING: Primary | ICD-10-CM

## 2024-02-13 PROCEDURE — 99207 PR NO CHARGE NURSE ONLY: CPT

## 2024-02-13 NOTE — PATIENT INSTRUCTIONS
WOUND CARE INSTRUCTIONS  for  ONE WEEK AFTER SURGERY      Right sideburn    Leave flat bandage on your skin for one week after today s bandage change.  In one week when you remove the bandage, you may resume your regular skin care routine, including washing with mild soap and water, applying moisturizer, make-up and sunscreen.    If there are any open or bleeding areas at the incision/graft site you should begin to cover the area with a bandage daily as follows:    Clean and dry the area with plain tap water using a Q-tip or sterile gauze pad.  Apply Polysporin or Bacitracin ointment to the open area.  Cover the wound with a band-aid or a sterile non-stick gauze pad and micropore paper tape.         SIGNS OF INFECTION  - If you notice any of these signs of infection, call your doctor right away: expanding redness around the wound.  - Yellow or greenish-colored pus or cloudy wound drainage.    - Red streaking spreading from the wound.  - Increased swelling, tenderness, or pain around the wound.   - Fever.    Please remember that yellow and clear drainage from a wound can be normal and related to normal wound healing.  Isolated drainage from a wound without a combination of the above features does not indicate infection.       *Once the bandages are removed, the scar will be red and firm (especially in the lip/chin area). This is normal and will fade in time. It might take 6-12 months for this to happen.     *Massaging the area will help the scar soften and fade quicker. Begin to massage the area one month after the bandages have been removed. To massage apply pressure directly and firmly over the scar with the fingertips and move in a circular motion. Massage the area for a few minutes several times a day. Continue to massage the site for several months.    *Approximately 6-8 weeks after surgery it is not uncommon to see the formation of  tender pimple-like  bump along the scar. This is normal. As the scar continues  to mature and the stitches underneath the skin begin to dissolve, this might occur. Do not pick or squeeze, this will resolve on it s own. Should one break open producing a small amount of drainage, apply Polysporin or Bacitracin ointment a few times a day until the wound is completely healed.    *Numbness in the surgical area is expected. It might take 12-18 months for the feeling to return to normal. During this time sensations of itchiness, tingling and occasional sharp pains might be noted. These feelings are normal and will subside once the nerves have completely healed.         IN CASE OF EMERGENCY: Dr Berkowitz [phone]     If you were seen in [place] call: [phone]    If you were seen in [place] call: [phone]

## 2024-02-13 NOTE — PROGRESS NOTES
Tom Summers comes into clinic today at the request of Dr. Berkowitz Ordering Provider for Wound Check Action taken: See Below.    This service provided today was under the supervising provider of the day Dr. Berkowitz, who was available if needed.    Pt returned to clinic for post surgery 1 week follow up bandage change. Pt has no complaints, denies pain. Bandage removed from right sideburn, area cleansed with normal saline. Site is healing and wound edges approximating well. Reapplied new steri strips and paper tape.    Advised to watch for signs/sx of infection; spreading redness, drainage, odor, fever. Call or report promptly to clinic. Pt given written instructions and informed to rtc as needed. Patient verbalized understanding.     Janice STOVALL,  CMA

## 2024-04-17 ENCOUNTER — HOSPITAL ENCOUNTER (OUTPATIENT)
Dept: GENERAL RADIOLOGY | Facility: CLINIC | Age: 89
Discharge: HOME | DRG: 286 | End: 2024-04-17
Attending: INTERNAL MEDICINE
Payer: COMMERCIAL

## 2024-04-17 ENCOUNTER — TELEPHONE (OUTPATIENT)
Dept: CARDIOLOGY | Facility: CLINIC | Age: 89
End: 2024-04-17

## 2024-04-17 ENCOUNTER — APPOINTMENT (OUTPATIENT)
Dept: CT IMAGING | Facility: CLINIC | Age: 89
DRG: 286 | End: 2024-04-17
Attending: EMERGENCY MEDICINE
Payer: COMMERCIAL

## 2024-04-17 ENCOUNTER — OFFICE VISIT (OUTPATIENT)
Dept: CARDIOLOGY | Facility: CLINIC | Age: 89
End: 2024-04-17
Payer: COMMERCIAL

## 2024-04-17 ENCOUNTER — HOSPITAL ENCOUNTER (OUTPATIENT)
Dept: CARDIOLOGY | Facility: CLINIC | Age: 89
Discharge: HOME | DRG: 286 | End: 2024-04-17
Attending: INTERNAL MEDICINE
Payer: COMMERCIAL

## 2024-04-17 ENCOUNTER — LAB (OUTPATIENT)
Dept: LAB | Facility: CLINIC | Age: 89
End: 2024-04-17
Payer: COMMERCIAL

## 2024-04-17 ENCOUNTER — HOSPITAL ENCOUNTER (INPATIENT)
Facility: CLINIC | Age: 89
LOS: 2 days | Discharge: HOME | DRG: 286 | End: 2024-04-19
Attending: EMERGENCY MEDICINE | Admitting: HOSPITALIST
Payer: COMMERCIAL

## 2024-04-17 VITALS
HEIGHT: 66 IN | HEART RATE: 95 BPM | DIASTOLIC BLOOD PRESSURE: 102 MMHG | OXYGEN SATURATION: 97 % | SYSTOLIC BLOOD PRESSURE: 166 MMHG | BODY MASS INDEX: 27.51 KG/M2 | WEIGHT: 171.2 LBS

## 2024-04-17 DIAGNOSIS — R06.09 DYSPNEA ON EXERTION: ICD-10-CM

## 2024-04-17 DIAGNOSIS — E78.5 HYPERLIPIDEMIA LDL GOAL <100: ICD-10-CM

## 2024-04-17 DIAGNOSIS — I25.9 CHEST PAIN DUE TO MYOCARDIAL ISCHEMIA, UNSPECIFIED ISCHEMIC CHEST PAIN TYPE: ICD-10-CM

## 2024-04-17 DIAGNOSIS — I50.30 HEART FAILURE WITH PRESERVED EJECTION FRACTION, NYHA CLASS I (H): ICD-10-CM

## 2024-04-17 DIAGNOSIS — R06.02 SHORTNESS OF BREATH: ICD-10-CM

## 2024-04-17 DIAGNOSIS — R94.31 NONSPECIFIC ABNORMAL ELECTROCARDIOGRAM (ECG) (EKG): ICD-10-CM

## 2024-04-17 DIAGNOSIS — R79.89 ELEVATED TROPONIN: ICD-10-CM

## 2024-04-17 DIAGNOSIS — R06.09 EXERTIONAL DYSPNEA: Primary | ICD-10-CM

## 2024-04-17 DIAGNOSIS — I10 BENIGN ESSENTIAL HYPERTENSION: ICD-10-CM

## 2024-04-17 DIAGNOSIS — Z13.9 SCREENING FOR CONDITION: ICD-10-CM

## 2024-04-17 DIAGNOSIS — I50.31 ACUTE DIASTOLIC CONGESTIVE HEART FAILURE (H): ICD-10-CM

## 2024-04-17 LAB
ALBUMIN SERPL BCG-MCNC: 4.1 G/DL (ref 3.5–5.2)
ALP SERPL-CCNC: 69 U/L (ref 40–150)
ALT SERPL W P-5'-P-CCNC: 12 U/L (ref 0–70)
ANION GAP SERPL CALCULATED.3IONS-SCNC: 15 MMOL/L (ref 7–15)
AST SERPL W P-5'-P-CCNC: 23 U/L (ref 0–45)
ATRIAL RATE - MUSE: 89 BPM
BI-PLANE LVEF ECHO: NORMAL
BILIRUB SERPL-MCNC: 0.4 MG/DL
BUN SERPL-MCNC: 16.1 MG/DL (ref 8–23)
CALCIUM SERPL-MCNC: 9.1 MG/DL (ref 8.8–10.2)
CHLORIDE SERPL-SCNC: 103 MMOL/L (ref 98–107)
CREAT SERPL-MCNC: 1.1 MG/DL (ref 0.67–1.17)
D DIMER PPP FEU-MCNC: 1.56 UG/ML FEU (ref 0–0.5)
DEPRECATED HCO3 PLAS-SCNC: 21 MMOL/L (ref 22–29)
DIASTOLIC BLOOD PRESSURE - MUSE: NORMAL MMHG
EGFRCR SERPLBLD CKD-EPI 2021: 65 ML/MIN/1.73M2
ERYTHROCYTE [DISTWIDTH] IN BLOOD BY AUTOMATED COUNT: 13.6 % (ref 10–15)
ERYTHROCYTE [DISTWIDTH] IN BLOOD BY AUTOMATED COUNT: 13.7 % (ref 10–15)
FLUAV RNA SPEC QL NAA+PROBE: NEGATIVE
FLUBV RNA RESP QL NAA+PROBE: NEGATIVE
GLUCOSE SERPL-MCNC: 105 MG/DL (ref 70–99)
HCT VFR BLD AUTO: 38.1 % (ref 40–53)
HCT VFR BLD AUTO: 40.3 % (ref 40–53)
HGB BLD-MCNC: 12.2 G/DL (ref 13.3–17.7)
HGB BLD-MCNC: 13.1 G/DL (ref 13.3–17.7)
INTERPRETATION ECG - MUSE: NORMAL
MCH RBC QN AUTO: 29.6 PG (ref 26.5–33)
MCH RBC QN AUTO: 30.1 PG (ref 26.5–33)
MCHC RBC AUTO-ENTMCNC: 32 G/DL (ref 31.5–36.5)
MCHC RBC AUTO-ENTMCNC: 32.5 G/DL (ref 31.5–36.5)
MCV RBC AUTO: 93 FL (ref 78–100)
MCV RBC AUTO: 93 FL (ref 78–100)
NT-PROBNP SERPL-MCNC: 1576 PG/ML (ref 0–1800)
P AXIS - MUSE: 88 DEGREES
PLATELET # BLD AUTO: 217 10E3/UL (ref 150–450)
PLATELET # BLD AUTO: 219 10E3/UL (ref 150–450)
POTASSIUM SERPL-SCNC: 4.1 MMOL/L (ref 3.4–5.3)
PR INTERVAL - MUSE: 162 MS
PROT SERPL-MCNC: 7.6 G/DL (ref 6.4–8.3)
QRS DURATION - MUSE: 82 MS
QT - MUSE: 370 MS
QTC - MUSE: 450 MS
R AXIS - MUSE: 6 DEGREES
RBC # BLD AUTO: 4.12 10E6/UL (ref 4.4–5.9)
RBC # BLD AUTO: 4.35 10E6/UL (ref 4.4–5.9)
RSV RNA SPEC NAA+PROBE: NEGATIVE
SARS-COV-2 RNA RESP QL NAA+PROBE: NEGATIVE
SODIUM SERPL-SCNC: 139 MMOL/L (ref 135–145)
SYSTOLIC BLOOD PRESSURE - MUSE: NORMAL MMHG
T AXIS - MUSE: -88 DEGREES
TROPONIN T SERPL HS-MCNC: 22 NG/L
TROPONIN T SERPL HS-MCNC: 24 NG/L
VENTRICULAR RATE- MUSE: 89 BPM
WBC # BLD AUTO: 7.5 10E3/UL (ref 4–11)
WBC # BLD AUTO: 7.8 10E3/UL (ref 4–11)

## 2024-04-17 PROCEDURE — 80061 LIPID PANEL: CPT | Performed by: INTERNAL MEDICINE

## 2024-04-17 PROCEDURE — 83880 ASSAY OF NATRIURETIC PEPTIDE: CPT | Performed by: INTERNAL MEDICINE

## 2024-04-17 PROCEDURE — 71275 CT ANGIOGRAPHY CHEST: CPT

## 2024-04-17 PROCEDURE — 99223 1ST HOSP IP/OBS HIGH 75: CPT | Performed by: HOSPITALIST

## 2024-04-17 PROCEDURE — 99204 OFFICE O/P NEW MOD 45 MIN: CPT | Performed by: INTERNAL MEDICINE

## 2024-04-17 PROCEDURE — 85379 FIBRIN DEGRADATION QUANT: CPT | Performed by: INTERNAL MEDICINE

## 2024-04-17 PROCEDURE — 93306 TTE W/DOPPLER COMPLETE: CPT | Mod: 26 | Performed by: INTERNAL MEDICINE

## 2024-04-17 PROCEDURE — 87637 SARSCOV2&INF A&B&RSV AMP PRB: CPT | Performed by: EMERGENCY MEDICINE

## 2024-04-17 PROCEDURE — 85014 HEMATOCRIT: CPT | Performed by: EMERGENCY MEDICINE

## 2024-04-17 PROCEDURE — 93005 ELECTROCARDIOGRAM TRACING: CPT

## 2024-04-17 PROCEDURE — 85027 COMPLETE CBC AUTOMATED: CPT | Performed by: INTERNAL MEDICINE

## 2024-04-17 PROCEDURE — 84484 ASSAY OF TROPONIN QUANT: CPT | Performed by: EMERGENCY MEDICINE

## 2024-04-17 PROCEDURE — 255N000002 HC RX 255 OP 636: Performed by: INTERNAL MEDICINE

## 2024-04-17 PROCEDURE — 36415 COLL VENOUS BLD VENIPUNCTURE: CPT | Performed by: EMERGENCY MEDICINE

## 2024-04-17 PROCEDURE — 99285 EMERGENCY DEPT VISIT HI MDM: CPT | Mod: 25

## 2024-04-17 PROCEDURE — 210N000002 HC R&B HEART CARE

## 2024-04-17 PROCEDURE — 250N000011 HC RX IP 250 OP 636: Performed by: EMERGENCY MEDICINE

## 2024-04-17 PROCEDURE — 80053 COMPREHEN METABOLIC PANEL: CPT | Performed by: INTERNAL MEDICINE

## 2024-04-17 PROCEDURE — 93000 ELECTROCARDIOGRAM COMPLETE: CPT | Performed by: INTERNAL MEDICINE

## 2024-04-17 PROCEDURE — 83036 HEMOGLOBIN GLYCOSYLATED A1C: CPT | Performed by: HOSPITALIST

## 2024-04-17 PROCEDURE — 250N000009 HC RX 250: Performed by: EMERGENCY MEDICINE

## 2024-04-17 PROCEDURE — 36415 COLL VENOUS BLD VENIPUNCTURE: CPT | Performed by: INTERNAL MEDICINE

## 2024-04-17 PROCEDURE — 71046 X-RAY EXAM CHEST 2 VIEWS: CPT

## 2024-04-17 PROCEDURE — C8929 TTE W OR WO FOL WCON,DOPPLER: HCPCS

## 2024-04-17 RX ORDER — HEPARIN SODIUM 10000 [USP'U]/100ML
0-5000 INJECTION, SOLUTION INTRAVENOUS CONTINUOUS
Status: DISCONTINUED | OUTPATIENT
Start: 2024-04-17 | End: 2024-04-18

## 2024-04-17 RX ORDER — LOSARTAN POTASSIUM 25 MG/1
25 TABLET ORAL DAILY
Qty: 90 TABLET | Refills: 3 | Status: ON HOLD | OUTPATIENT
Start: 2024-04-17 | End: 2024-04-19

## 2024-04-17 RX ORDER — IOPAMIDOL 755 MG/ML
65 INJECTION, SOLUTION INTRAVASCULAR ONCE
Status: COMPLETED | OUTPATIENT
Start: 2024-04-17 | End: 2024-04-17

## 2024-04-17 RX ADMIN — HEPARIN SODIUM 950 UNITS/HR: 10000 INJECTION, SOLUTION INTRAVENOUS at 22:24

## 2024-04-17 RX ADMIN — SODIUM CHLORIDE 90 ML: 9 INJECTION, SOLUTION INTRAVENOUS at 18:30

## 2024-04-17 RX ADMIN — HUMAN ALBUMIN MICROSPHERES AND PERFLUTREN 9 ML: 10; .22 INJECTION, SOLUTION INTRAVENOUS at 13:47

## 2024-04-17 RX ADMIN — IOPAMIDOL 65 ML: 755 INJECTION, SOLUTION INTRAVENOUS at 18:29

## 2024-04-17 ASSESSMENT — ACTIVITIES OF DAILY LIVING (ADL)
ADLS_ACUITY_SCORE: 35

## 2024-04-17 ASSESSMENT — COLUMBIA-SUICIDE SEVERITY RATING SCALE - C-SSRS
2. HAVE YOU ACTUALLY HAD ANY THOUGHTS OF KILLING YOURSELF IN THE PAST MONTH?: NO
1. IN THE PAST MONTH, HAVE YOU WISHED YOU WERE DEAD OR WISHED YOU COULD GO TO SLEEP AND NOT WAKE UP?: NO
6. HAVE YOU EVER DONE ANYTHING, STARTED TO DO ANYTHING, OR PREPARED TO DO ANYTHING TO END YOUR LIFE?: NO

## 2024-04-17 ASSESSMENT — ENCOUNTER SYMPTOMS
COUGH: 1
VOMITING: 0
FEVER: 0
FATIGUE: 1
BLOOD IN STOOL: 0
RHINORRHEA: 0
DIFFICULTY URINATING: 0
SHORTNESS OF BREATH: 1

## 2024-04-17 NOTE — ED TRIAGE NOTES
Erika sent here for abnormal labs and to r/o PE. Patient was seen this morning for increased SOB and fatigue. Had labs drawn and increased Ddimer. Denies pain.     Triage Assessment (Adult)       Row Name 04/17/24 1640          Respiratory WDL    Respiratory WDL X;rhythm/pattern     Rhythm/Pattern, Respiratory shortness of breath        Cardiac WDL    Cardiac WDL WDL        Cognitive/Neuro/Behavioral WDL    Cognitive/Neuro/Behavioral WDL WDL                      27-Jan-2017 16:52

## 2024-04-17 NOTE — TELEPHONE ENCOUNTER
Reply from Dr. Ashraf: send patient to ED now for  stat CT of chest/PE   He is aware that this might need to be done.    1513 tried both phones listed for the patient and left a voice mail on both that he needs to report to the ED for further testing.   Left message for patient to call back to Team 2 R.N.s @ [phone] 1545 spoke with patient and spouse. They were on their way back to [place], but will turn around and head back to [facility] ED now.  Called ED with update of today's findings and Dr. Ashraf's recommendation for CT chest for PE

## 2024-04-17 NOTE — ED PROVIDER NOTES
.  History     Chief Complaint:  Shortness of Breath and Fatigue       The history is provided by the patient.      Tom Summers is a 89 year old male who presents for evaluation of shortness of breath and fatigue. The patient reports that for the past two to three months he has been becoming short of breath and exhausted with any exertion. These symptoms have been rapidly worsening in recent weeks. He saw a cardiologist today who told him to go to the ED due to concern of pulmonary embolism.  Patient reports that his symptoms recently have become so severe that any degree of exertion makes him short of breath.  He reports that he most notices his symptoms after periods of prolonged sitting. He notes that he got a cold a few days ago and has been coughing up phlegm.  He denies any chest pain and history of blood clots.     Review of Systems   Constitutional:  Positive for fatigue. Negative for fever.   HENT:  Positive for congestion. Negative for rhinorrhea.    Respiratory:  Positive for cough and shortness of breath.    Cardiovascular:  Negative for chest pain and leg swelling.   Gastrointestinal:  Negative for blood in stool and vomiting.   Genitourinary:  Negative for difficulty urinating.   Neurological:  Negative for dizziness and headaches.       Independent Historian:   None - Patient Only    Review of External Notes:   I reviewed a cardiology office visit note with Dr. Ashraf from 4/17/24.   I reviewed an ECG from 6/22/23.     Medications:    Furosemide  Levocetirizine  Losartan    Past Medical History:    Squamous cell carcinoma of skin, unspecified  Pseudophakia of both eyes  Primary open angle glaucoma of both eyes, mild stage  Skin lesion of scalp  Actinic keratoses  Post-traumatic osteoarthritis, right ankle and foot  S/P total replacement of left hip  Other and unspecified hyperlipidemia  Actinic keratosis  Primary localized osteoarthritis    Past Surgical History:    IN subtalar arthroereisis  Hernia  "repair  NE repair inguinal hernia  Total hip arthroplasty  Cataract extraction w/ intraocular lens implant     Physical Exam   Patient Vitals for the past 24 hrs:   BP Temp Temp src Pulse Resp SpO2 Height Weight   04/18/24 0112 -- 97.7  F (36.5  C) Oral -- -- 99 % -- 76.7 kg (169 lb)   04/17/24 2220 (!) 169/99 -- -- 93 16 99 % -- --   04/17/24 2155 -- -- -- 93 -- 96 % -- 78.9 kg (174 lb)   04/17/24 2145 -- -- -- 90 -- 96 % -- --   04/17/24 2130 -- -- -- 96 -- 95 % -- --   04/17/24 2115 -- -- -- 96 -- 98 % -- --   04/17/24 2042 (!) 174/95 -- -- 89 -- 99 % -- --   04/17/24 1641 -- -- -- -- -- -- 1.676 m (5' 6\") 77.1 kg (170 lb)   04/17/24 1639 (!) 171/94 97.9  F (36.6  C) Oral 85 18 96 % 1.676 m (5' 6\") --      Constitutional:       Appearance: Normal appearance.   HENT:      Head: Normocephalic and atraumatic.   Eyes:      Extraocular Movements: Extraocular movements intact.      Conjunctiva/sclera: Conjunctivae normal.   Cardiovascular:      Rate and Rhythm: Normal rate and regular rhythm.   Pulmonary:      Effort: Pulmonary effort is normal. No respiratory distress.      Breath sounds: Clear to auscultation bilaterally.  Abdominal:      General: Abdomen is flat. There is no distension.      Palpations: Abdomen is soft.      Tenderness: There is no abdominal tenderness.   Musculoskeletal:      Cervical back: Normal range of motion. No rigidity.       Right lower leg: No edema.      Left lower leg: No edema.   Skin:     General: Skin is warm and dry.   Neurological:      General: No focal deficit present.      Mental Status: Alert and oriented to person, place, and time.   Psychiatric:         Mood and Affect: Mood normal.         Behavior: Behavior normal.    Emergency Department Course   ECG  ECG results from 04/17/24   EKG 12-lead, tracing only     Value    Systolic Blood Pressure     Diastolic Blood Pressure     Ventricular Rate 89    Atrial Rate 89    NE Interval 162    QRS Duration 82        QTc 450    P " Axis 88    R AXIS 6    T Axis -88    Interpretation ECG      Sinus rhythm  Nonspecific ST and T wave abnormality  Abnormal ECG  No previous ECGs available  Confirmed by GENERATED REPORT, COMPUTER ([id number]),  Inga Negrete ([id number]) on 4/17/2024 9:41:20 PM        See ED course for independent interpretation.     Imaging:  CT Chest Pulmonary Embolism w Contrast   Final Result   IMPRESSION:   1.  No evidence of pulmonary embolus or other acute abnormality in the chest.   2.  Incidental 4 mm right upper lobe pulmonary nodule, consider follow-up described below.      REFERENCE:   Guidelines for Management of Incidental Pulmonary Nodules Detected on CT Images: From the Fleischner Society 2017.    Guidelines apply to incidental nodules in patients who are 35 years or older.   Guidelines do not apply to lung cancer screening, patients with immunosuppression, or patients with known primary cancer.      SINGLE NODULE   Nodule size <6 mm   Low-risk patients: No follow-up needed.   High-risk patients: Optional follow-up at 12 months.               Report per radiology    Laboratory:  Labs Ordered and Resulted from Time of ED Arrival to Time of ED Departure   TROPONIN T, HIGH SENSITIVITY - Abnormal       Result Value    Troponin T, High Sensitivity 24 (*)    CBC WITH PLATELETS - Abnormal    WBC Count 7.5      RBC Count 4.12 (*)     Hemoglobin 12.2 (*)     Hematocrit 38.1 (*)     MCV 93      MCH 29.6      MCHC 32.0      RDW 13.7      Platelet Count 217     INFLUENZA A/B, RSV, & SARS-COV2 PCR - Normal    Influenza A PCR Negative      Influenza B PCR Negative      RSV PCR Negative      SARS CoV2 PCR Negative     TROPONIN T, HIGH SENSITIVITY - Normal    Troponin T, High Sensitivity 22          Emergency Department Course & Assessments:         Interventions:  Medications   heparin 25,000 units in 0.45% NaCl 250 mL ANTICOAGULANT infusion (950 Units/hr Intravenous Rate/Dose Verify 4/18/24 0112)   guaiFENesin-dextromethorphan  (ROBITUSSIN DM) 100-10 MG/5ML syrup 10 mL (has no administration in time range)   iopamidol (ISOVUE-370) solution 65 mL (65 mLs Intravenous $Given 4/17/24 1829)   Saline Flush (90 mLs Intravenous $Given 4/17/24 1830)   heparin loading dose for LOW INTENSITY TREATMENT * Give BEFORE starting heparin infusion (4,750 Units Intravenous $Given 4/17/24 2223)        Independent Interpretation (X-rays, CTs, rhythm strip):  See ED Course    Assessments/Consultations/Discussion of Management or Tests:  ED Course as of 04/18/24 0126   Wed Apr 17, 2024   1646 Normal sinus rhythm.  Rate of 89.  Normal [place] and QRS.  QTc 450.  Nonspecific ST depressions in inferolateral leads.  Mild T wave inversion.  No significant change as compared with EKG from earlier today in cardiology clinic.  No STEMI.   1659 I examined the patient and obtained history.      2111 I rechecked the patient.    2120 I spoke with Dr. Cook, Cardiology, regarding the patient's history and presentation in the emergency department today.  Dr. Cook recommends placing patient on heparin drip and plan for cardiac catheterization tomorrow.     2140 Patient and children updated on discussion with cardiology, their recommendation for admission for next day catheterization, and patient's workup from today.  Had extensive discussion regarding patient's EKG findings and discussed risk factors. Patient initially hesitant on staying in the hospital, but patient's children and wife will have discussion with him for final disposition.   2153 Troponin T, High Sensitivity(!): 24  Delta<7   2200 I was updated by patient and family that have decided to agree with admission for cardiac cath and heparinization.  Patient denies history of bleeding or contraindication to initiation of heparin.   2215 I spoke with Dr. Templeton of the hospitalist team regarding the patient, who accepted the patient for admission.         Social Determinants of Health affecting care:    None    Disposition:  The patient was admitted to the hospital under the care of Dr. Templeton.     Impression & Plan    CMS Diagnoses: None    MIPS (If applicable):  CT for PE was ordered because the patient had an abnormal d-dimer.     Medical Decision Makin-year-old male as described above presents to the emergency department with progressive worsening exertional dyspnea for the past 2 months.  He is scheduled for a outpatient angiography on 2024 with Dr. Ashraf after evaluation today.  Per chart review, patient underwent echocardiogram on 2023 that demonstrated normal biventricular systolic function with mild to moderate left ventricular hypertrophy and moderate biatrial enlargement.  Also had a Lexiscan stress perfusion imaging in 2019 which was negative for ischemia.  Per cardiology note reviewed, he was reporting dyspnea on exertion as well as atypical chest discomfort at that time as well.  Patient however was sent into the ER for advanced imaging after outpatient D-dimer today resulted elevated at 1.56.  At the time of my evaluation, patient is not tachycardic or hypoxic.  Will obtain CT PE chest for rule out of pulmonary embolism in addition to evaluate for other potential underlying differentials including pneumonia, viral URI, and CHF.  Outpatient BMP today 1576.  Cardiac enzyme was not obtained at outpatient cardiologist today.  Will obtain troponin.  On review of patient's EKG today, there is ST depression and T wave inversion in inferolateral leads, but this appears similar as compared to 2024 EKG from earlier today and outside facility EKG from 2023.  If remainder of workup negative, consider discussion with cardiologist regarding potential evaluation for early cardiac catheterization versus continuing outpatient evaluation as scheduled since patient not endorsing chest pain at this time.  Discussed care plan with patient who voiced understanding and agreement with plan.   Answered all questions.  Additional workup and orders as listed in chart.     Please refer to ED course above as part of continuation of MDM for details on the patient's treatment course and any changes or updates in care plan beyond my initial evaluation and MDM creation.    Diagnosis:    ICD-10-CM    1. Exertional dyspnea  R06.09       2. Nonspecific abnormal electrocardiogram (ECG) (EKG)  R94.31            Discharge Medications:  Current Discharge Medication List             Scribe Disclosure:  Koko PEDROZA, am serving as a scribe at 5:04 PM on 4/17/2024 to document services personally performed by Javier Hollis DO based on my observations and the provider's statements to me.     4/17/2024   Javier Hollis DO Yeh, Ferris, DO  04/18/24 0126       Javier Hollis DO  04/18/24 0126

## 2024-04-17 NOTE — TELEPHONE ENCOUNTER
BNP, D dimer, CBC, cmp 4/17/2024 noted. Drawn after OV same day with work up for dyspnea    Lipid results are pending  Echo today is pending  CXR: negative chest    Component      Latest Ref Rng 4/17/2024  8:47 AM   D-Dimer Quantitative      0.00 - 0.50 ug/mL FEU 1.56 (H)      Per Dr. Ashraf's dictation today:  Exertional dyspnea which has been progressing over the past few months.     Will message Dr. Ashraf to review

## 2024-04-17 NOTE — LETTER
4/17/2024    Physician No Ref-Primary  No address on file    RE: Tom KARLA Summers       Dear Colleague,     I had the pleasure of seeing Tom Summers in the [facility].  Reason for Consultation: Dyspnea on exertion      History of Presenting Illness: This is an 89-year-old gentleman with a past medical history significant for untreated hypertension who has been experiencing generalized fatigue and dyspnea on exertion which he reported in June 2023 to his primary care physician.  At the time he underwent an echocardiogram which was performed on 7/25/2023 and demonstrated normal biventricular systolic function with mild to moderate left ventricular hypertrophy and moderate biatrial enlargement.    He has also undergone Lexiscan stress perfusion imaging in 2019 which was negative for ischemia.  At that time he was also reporting dyspnea on exertion as well as atypical chest discomfort.    He presents today due to worsening of his exertional dyspnea.  The symptoms have rapidly progressed in the past few weeks and he states that they tend to occur now with minimal exertion.  He is a retired  who has always stayed very active, but is now needing to stop and rest while going to his mailbox as well as while climbing stairs.  He denies any chest pressure or palpitations.  He denies any syncope or presyncope.  He denies any PND or orthopnea.    He has been checking his blood pressure at home and most recently was in the moderately elevated range.  As stated above, he has not been on antihypertensive therapy.      PMH, FH, SH, Allergies and Meds: As outlined below.    Physical Exam:    Gen: NAD  Respiratory : Clear to Auscultation.  Cardiovascular: RRR, no murmurs  Extremities: No edema.          EKG: EKG obtained today was personally reviewed and independently interpreted demonstrates sinus rhythm with nonspecific ST segment abnormalities.    Labs: Lipids on 6/22/2023 demonstrated a total  cholesterol of 229, HDL 61 and LDL of 130.  Triglycerides were 192.    BNP was elevated on 6/22/2023 at 277.    Other Cardiac Testing:          IMPRESSION:    Exertional dyspnea which has been progressing over the past few months.  2.  Hypertension, untreated.    PLAN:    Mr. Summers presents today for an evaluation regarding rapidly progressive exertional dyspnea in the context of untreated hypertension as described above.    As we discussed, the differential diagnosis for her symptoms is quite broad.  This would include hematological issues such as anemia, noncardiac etiologies such as lung disease or pulmonary emboli, and cardiac issues such as left ventricular dysfunction, pulmonary hypertension and/or obstructive coronary artery disease.  Although his symptoms are not typical of an arrhythmic etiology, this is also in the differential.    At this point in time I recommended we obtain comprehensive blood work to evaluate for these potential underlying etiologies as well as a chest x-ray.  An echocardiogram will be scheduled, and I have recommended that we also schedule coronary angiography given the progressive exertional nature of his symptoms.  I have discussed the indications, risks and benefits of coronary angiography in the setting to the patient in detail who is agreeable with proceeding.  Obviously if significant abnormalities are identified prior to his angiogram that suggest a noncardiac etiology we will defer this option.    Regarding his hypertension, I have started him on losartan 25 mg daily.  I have ordered a BMP to be obtained 2 weeks from today.    It was a pleasure seeing him in consultation.          Karlos Ashraf M.D.       CURRENT MEDICATIONS:  Current Outpatient Medications   Medication Sig Dispense Refill    Bevacizumab 2.5 MG/0.1ML SOSY       fluticasone (FLONASE) 50 MCG/ACT nasal spray Spray 1 spray in nostril      furosemide (LASIX) 20 MG tablet Take 1 tablet by mouth every morning       levocetirizine (XYZAL) 5 MG tablet Take 5 mg by mouth         ALLERGIES   No Known Allergies    PAST MEDICAL HISTORY:  Past Medical History:   Diagnosis Date    Squamous cell carcinoma of skin, unspecified        PAST SURGICAL HISTORY:  No past surgical history on file.    FAMILY HISTORY:  No family history on file.    SOCIAL HISTORY:  Social History     Socioeconomic History    Marital status:    Tobacco Use    Smoking status: Never    Smokeless tobacco: Never     Social Determinants of Health      Received from [employer]    Financial Resource Strain    Received from [employer]    Social Connections       Review of Systems:  Skin:          Eyes:         ENT:         Respiratory:          Cardiovascular:         Gastroenterology:        Genitourinary:         Musculoskeletal:         Neurologic:         Psychiatric:         Heme/Lymph/Imm:         Endocrine:           Physical Exam:  Vitals: There were no vitals taken for this visit.    Constitutional:           Skin:             Head:           Eyes:           Lymph:      ENT:           Neck:           Respiratory:            Cardiac:                                                           GI:           Extremities and Muscular Skeletal:                 Neurological:           Psych:           CC  No referring provider defined for this encounter.        Thank you for allowing me to participate in the care of your patient.      Sincerely,     Karlos Ashraf MD     [facility]

## 2024-04-17 NOTE — PROGRESS NOTES
Reason for Consultation: Dyspnea on exertion      History of Presenting Illness: This is an 89-year-old gentleman with a past medical history significant for untreated hypertension who has been experiencing generalized fatigue and dyspnea on exertion which he reported in June 2023 to his primary care physician.  At the time he underwent an echocardiogram which was performed on 7/25/2023 and demonstrated normal biventricular systolic function with mild to moderate left ventricular hypertrophy and moderate biatrial enlargement.    He has also undergone Lexiscan stress perfusion imaging in 2019 which was negative for ischemia.  At that time he was also reporting dyspnea on exertion as well as atypical chest discomfort.    He presents today due to worsening of his exertional dyspnea.  The symptoms have rapidly progressed in the past few weeks and he states that they tend to occur now with minimal exertion.  He is a retired  who has always stayed very active, but is now needing to stop and rest while going to his mailbox as well as while climbing stairs.  He denies any chest pressure or palpitations.  He denies any syncope or presyncope.  He denies any PND or orthopnea.    He has been checking his blood pressure at home and most recently was in the moderately elevated range.  As stated above, he has not been on antihypertensive therapy.      PMH, FH, SH, Allergies and Meds: As outlined below.    Physical Exam:    Gen: NAD  Respiratory : Clear to Auscultation.  Cardiovascular: RRR, no murmurs  Extremities: No edema.          EKG: EKG obtained today was personally reviewed and independently interpreted demonstrates sinus rhythm with nonspecific ST segment abnormalities.    Labs: Lipids on 6/22/2023 demonstrated a total cholesterol of 229, HDL 61 and LDL of 130.  Triglycerides were 192.    BNP was elevated on 6/22/2023 at 277.    Other Cardiac Testing:          IMPRESSION:    Exertional dyspnea which has been  progressing over the past few months.  2.  Hypertension, untreated.    PLAN:    Mr. Summers presents today for an evaluation regarding rapidly progressive exertional dyspnea in the context of untreated hypertension as described above.    As we discussed, the differential diagnosis for her symptoms is quite broad.  This would include hematological issues such as anemia, noncardiac etiologies such as lung disease or pulmonary emboli, and cardiac issues such as left ventricular dysfunction, pulmonary hypertension and/or obstructive coronary artery disease.  Although his symptoms are not typical of an arrhythmic etiology, this is also in the differential.    At this point in time I recommended we obtain comprehensive blood work to evaluate for these potential underlying etiologies as well as a chest x-ray.  An echocardiogram will be scheduled, and I have recommended that we also schedule coronary angiography given the progressive exertional nature of his symptoms.  I have discussed the indications, risks and benefits of coronary angiography in the setting to the patient in detail who is agreeable with proceeding.  Obviously if significant abnormalities are identified prior to his angiogram that suggest a noncardiac etiology we will defer this option.    Regarding his hypertension, I have started him on losartan 25 mg daily.  I have ordered a BMP to be obtained 2 weeks from today.    It was a pleasure seeing him in consultation.          Karlos Ashraf M.D.       CURRENT MEDICATIONS:  Current Outpatient Medications   Medication Sig Dispense Refill    Bevacizumab 2.5 MG/0.1ML SOSY       fluticasone (FLONASE) 50 MCG/ACT nasal spray Spray 1 spray in nostril      furosemide (LASIX) 20 MG tablet Take 1 tablet by mouth every morning      levocetirizine (XYZAL) 5 MG tablet Take 5 mg by mouth         ALLERGIES   No Known Allergies    PAST MEDICAL HISTORY:  Past Medical History:   Diagnosis Date    Squamous cell carcinoma of skin,  unspecified        PAST SURGICAL HISTORY:  No past surgical history on file.    FAMILY HISTORY:  No family history on file.    SOCIAL HISTORY:  Social History     Socioeconomic History    Marital status:    Tobacco Use    Smoking status: Never    Smokeless tobacco: Never     Social Determinants of Health      Received from [facility]    Financial Resource Strain    Received from [facility] [employer] [facility]    Social Connections       Review of Systems:  Skin:          Eyes:         ENT:         Respiratory:          Cardiovascular:         Gastroenterology:        Genitourinary:         Musculoskeletal:         Neurologic:         Psychiatric:         Heme/Lymph/Imm:         Endocrine:           Physical Exam:  Vitals: There were no vitals taken for this visit.    Constitutional:           Skin:             Head:           Eyes:           Lymph:      ENT:           Neck:           Respiratory:            Cardiac:                                                           GI:           Extremities and Muscular Skeletal:                 Neurological:           Psych:           CC  No referring provider defined for this encounter.

## 2024-04-17 NOTE — TELEPHONE ENCOUNTER
Echo 4/17/2024 noted. Ordered post-OV same day for work up of dyspnea.    Echo:  There is borderline concentric left ventricular hypertrophy.  Biplane LVEF is 52%.  Septal motion is consistent with conduction abnormality.  Regional wall motion abnormalities cannot be excluded due to limited  visualization (lateral and inferolateral shadowing).  No significant valvular heart disease.    Will message Dr. Ashraf to review

## 2024-04-18 LAB
ACT BLD: 170 SECONDS (ref 74–150)
ANION GAP SERPL CALCULATED.3IONS-SCNC: 7 MMOL/L (ref 7–15)
BASOPHILS # BLD AUTO: 0 10E3/UL (ref 0–0.2)
BASOPHILS NFR BLD AUTO: 0 %
BUN SERPL-MCNC: 17.5 MG/DL (ref 8–23)
CALCIUM SERPL-MCNC: 8.6 MG/DL (ref 8.8–10.2)
CHLORIDE SERPL-SCNC: 106 MMOL/L (ref 98–107)
CHOLEST SERPL-MCNC: 192 MG/DL
CREAT SERPL-MCNC: 1.09 MG/DL (ref 0.67–1.17)
DEPRECATED HCO3 PLAS-SCNC: 28 MMOL/L (ref 22–29)
EGFRCR SERPLBLD CKD-EPI 2021: 66 ML/MIN/1.73M2
EOSINOPHIL # BLD AUTO: 0.4 10E3/UL (ref 0–0.7)
EOSINOPHIL NFR BLD AUTO: 7 %
ERYTHROCYTE [DISTWIDTH] IN BLOOD BY AUTOMATED COUNT: 13.4 % (ref 10–15)
ERYTHROCYTE [DISTWIDTH] IN BLOOD BY AUTOMATED COUNT: 13.6 % (ref 10–15)
FASTING STATUS PATIENT QL REPORTED: YES
GLUCOSE SERPL-MCNC: 103 MG/DL (ref 70–99)
HBA1C MFR BLD: 5.6 %
HCT VFR BLD AUTO: 35.9 % (ref 40–53)
HCT VFR BLD AUTO: 36.8 % (ref 40–53)
HDLC SERPL-MCNC: 60 MG/DL
HGB BLD-MCNC: 11.6 G/DL (ref 13.3–17.7)
HGB BLD-MCNC: 11.9 G/DL (ref 13.3–17.7)
IMM GRANULOCYTES # BLD: 0 10E3/UL
IMM GRANULOCYTES NFR BLD: 0 %
LDLC SERPL CALC-MCNC: 118 MG/DL
LYMPHOCYTES # BLD AUTO: 1.5 10E3/UL (ref 0.8–5.3)
LYMPHOCYTES NFR BLD AUTO: 23 %
MCH RBC QN AUTO: 29.8 PG (ref 26.5–33)
MCH RBC QN AUTO: 30 PG (ref 26.5–33)
MCHC RBC AUTO-ENTMCNC: 32.3 G/DL (ref 31.5–36.5)
MCHC RBC AUTO-ENTMCNC: 32.3 G/DL (ref 31.5–36.5)
MCV RBC AUTO: 92 FL (ref 78–100)
MCV RBC AUTO: 93 FL (ref 78–100)
MONOCYTES # BLD AUTO: 0.8 10E3/UL (ref 0–1.3)
MONOCYTES NFR BLD AUTO: 13 %
NEUTROPHILS # BLD AUTO: 3.6 10E3/UL (ref 1.6–8.3)
NEUTROPHILS NFR BLD AUTO: 57 %
NONHDLC SERPL-MCNC: 132 MG/DL
NRBC # BLD AUTO: 0 10E3/UL
NRBC BLD AUTO-RTO: 0 /100
PLATELET # BLD AUTO: 192 10E3/UL (ref 150–450)
PLATELET # BLD AUTO: 193 10E3/UL (ref 150–450)
POTASSIUM SERPL-SCNC: 4.7 MMOL/L (ref 3.4–5.3)
RBC # BLD AUTO: 3.89 10E6/UL (ref 4.4–5.9)
RBC # BLD AUTO: 3.97 10E6/UL (ref 4.4–5.9)
SODIUM SERPL-SCNC: 141 MMOL/L (ref 135–145)
TRIGL SERPL-MCNC: 69 MG/DL
TROPONIN T SERPL HS-MCNC: 25 NG/L
UFH PPP CHRO-ACNC: 0.73 IU/ML
WBC # BLD AUTO: 6.3 10E3/UL (ref 4–11)
WBC # BLD AUTO: 6.6 10E3/UL (ref 4–11)

## 2024-04-18 PROCEDURE — 85347 COAGULATION TIME ACTIVATED: CPT

## 2024-04-18 PROCEDURE — 93458 L HRT ARTERY/VENTRICLE ANGIO: CPT | Mod: 26 | Performed by: INTERNAL MEDICINE

## 2024-04-18 PROCEDURE — 85520 HEPARIN ASSAY: CPT | Performed by: EMERGENCY MEDICINE

## 2024-04-18 PROCEDURE — 4A0335C MEASUREMENT OF ARTERIAL FLOW, CORONARY, PERCUTANEOUS APPROACH: ICD-10-PCS | Performed by: INTERNAL MEDICINE

## 2024-04-18 PROCEDURE — 99223 1ST HOSP IP/OBS HIGH 75: CPT | Mod: 25 | Performed by: INTERNAL MEDICINE

## 2024-04-18 PROCEDURE — C1769 GUIDE WIRE: HCPCS | Performed by: INTERNAL MEDICINE

## 2024-04-18 PROCEDURE — 99232 SBSQ HOSP IP/OBS MODERATE 35: CPT | Performed by: HOSPITALIST

## 2024-04-18 PROCEDURE — 93571 IV DOP VEL&/PRESS C FLO 1ST: CPT | Mod: 26 | Performed by: INTERNAL MEDICINE

## 2024-04-18 PROCEDURE — 80048 BASIC METABOLIC PNL TOTAL CA: CPT | Performed by: HOSPITALIST

## 2024-04-18 PROCEDURE — 93458 L HRT ARTERY/VENTRICLE ANGIO: CPT | Performed by: INTERNAL MEDICINE

## 2024-04-18 PROCEDURE — 82465 ASSAY BLD/SERUM CHOLESTEROL: CPT | Performed by: HOSPITALIST

## 2024-04-18 PROCEDURE — 36415 COLL VENOUS BLD VENIPUNCTURE: CPT | Performed by: HOSPITALIST

## 2024-04-18 PROCEDURE — 85025 COMPLETE CBC W/AUTO DIFF WBC: CPT | Performed by: HOSPITALIST

## 2024-04-18 PROCEDURE — 272N000001 HC OR GENERAL SUPPLY STERILE: Performed by: INTERNAL MEDICINE

## 2024-04-18 PROCEDURE — 250N000011 HC RX IP 250 OP 636: Performed by: INTERNAL MEDICINE

## 2024-04-18 PROCEDURE — 99152 MOD SED SAME PHYS/QHP 5/>YRS: CPT | Mod: GC | Performed by: INTERNAL MEDICINE

## 2024-04-18 PROCEDURE — 99153 MOD SED SAME PHYS/QHP EA: CPT | Performed by: INTERNAL MEDICINE

## 2024-04-18 PROCEDURE — 85027 COMPLETE CBC AUTOMATED: CPT | Performed by: HOSPITALIST

## 2024-04-18 PROCEDURE — 250N000013 HC RX MED GY IP 250 OP 250 PS 637: Performed by: INTERNAL MEDICINE

## 2024-04-18 PROCEDURE — 93799 UNLISTED CV SVC/PROCEDURE: CPT | Performed by: INTERNAL MEDICINE

## 2024-04-18 PROCEDURE — 4A023N7 MEASUREMENT OF CARDIAC SAMPLING AND PRESSURE, LEFT HEART, PERCUTANEOUS APPROACH: ICD-10-PCS | Performed by: INTERNAL MEDICINE

## 2024-04-18 PROCEDURE — 210N000002 HC R&B HEART CARE

## 2024-04-18 PROCEDURE — 250N000009 HC RX 250: Performed by: INTERNAL MEDICINE

## 2024-04-18 PROCEDURE — B2111ZZ FLUOROSCOPY OF MULTIPLE CORONARY ARTERIES USING LOW OSMOLAR CONTRAST: ICD-10-PCS | Performed by: INTERNAL MEDICINE

## 2024-04-18 PROCEDURE — C1760 CLOSURE DEV, VASC: HCPCS | Performed by: INTERNAL MEDICINE

## 2024-04-18 PROCEDURE — C1887 CATHETER, GUIDING: HCPCS | Performed by: INTERNAL MEDICINE

## 2024-04-18 PROCEDURE — 84484 ASSAY OF TROPONIN QUANT: CPT | Performed by: HOSPITALIST

## 2024-04-18 PROCEDURE — 250N000013 HC RX MED GY IP 250 OP 250 PS 637: Performed by: HOSPITALIST

## 2024-04-18 PROCEDURE — 99152 MOD SED SAME PHYS/QHP 5/>YRS: CPT | Performed by: INTERNAL MEDICINE

## 2024-04-18 DEVICE — CLOSURE ANGIOSEAL 6FR 610130: Type: IMPLANTABLE DEVICE | Status: FUNCTIONAL

## 2024-04-18 RX ORDER — LOSARTAN POTASSIUM 25 MG/1
25 TABLET ORAL DAILY
Status: DISCONTINUED | OUTPATIENT
Start: 2024-04-18 | End: 2024-04-19

## 2024-04-18 RX ORDER — ONDANSETRON 2 MG/ML
4 INJECTION INTRAMUSCULAR; INTRAVENOUS EVERY 6 HOURS PRN
Status: DISCONTINUED | OUTPATIENT
Start: 2024-04-18 | End: 2024-04-19 | Stop reason: HOSPADM

## 2024-04-18 RX ORDER — CALCIUM CARBONATE 500 MG/1
1000 TABLET, CHEWABLE ORAL 4 TIMES DAILY PRN
Status: DISCONTINUED | OUTPATIENT
Start: 2024-04-18 | End: 2024-04-19 | Stop reason: HOSPADM

## 2024-04-18 RX ORDER — NITROGLYCERIN 0.4 MG/1
0.4 TABLET SUBLINGUAL EVERY 5 MIN PRN
Status: DISCONTINUED | OUTPATIENT
Start: 2024-04-18 | End: 2024-04-19 | Stop reason: HOSPADM

## 2024-04-18 RX ORDER — POTASSIUM CHLORIDE 1500 MG/1
20 TABLET, EXTENDED RELEASE ORAL
Status: DISCONTINUED | OUTPATIENT
Start: 2024-04-18 | End: 2024-04-18 | Stop reason: HOSPADM

## 2024-04-18 RX ORDER — FENTANYL CITRATE 50 UG/ML
INJECTION, SOLUTION INTRAMUSCULAR; INTRAVENOUS
Status: DISCONTINUED | OUTPATIENT
Start: 2024-04-18 | End: 2024-04-18 | Stop reason: HOSPADM

## 2024-04-18 RX ORDER — LIDOCAINE 40 MG/G
CREAM TOPICAL
Status: DISCONTINUED | OUTPATIENT
Start: 2024-04-18 | End: 2024-04-19 | Stop reason: HOSPADM

## 2024-04-18 RX ORDER — METOPROLOL TARTRATE 25 MG/1
25 TABLET, FILM COATED ORAL 2 TIMES DAILY
Status: DISCONTINUED | OUTPATIENT
Start: 2024-04-18 | End: 2024-04-19

## 2024-04-18 RX ORDER — AMOXICILLIN 250 MG
1 CAPSULE ORAL 2 TIMES DAILY PRN
Status: DISCONTINUED | OUTPATIENT
Start: 2024-04-18 | End: 2024-04-19 | Stop reason: HOSPADM

## 2024-04-18 RX ORDER — SODIUM CHLORIDE 9 MG/ML
INJECTION, SOLUTION INTRAVENOUS CONTINUOUS
Status: CANCELLED | OUTPATIENT
Start: 2024-04-18

## 2024-04-18 RX ORDER — ASPIRIN 81 MG/1
81 TABLET, CHEWABLE ORAL DAILY
Status: DISCONTINUED | OUTPATIENT
Start: 2024-04-19 | End: 2024-04-19 | Stop reason: HOSPADM

## 2024-04-18 RX ORDER — GUAIFENESIN/DEXTROMETHORPHAN 100-10MG/5
10 SYRUP ORAL EVERY 4 HOURS PRN
Status: DISCONTINUED | OUTPATIENT
Start: 2024-04-18 | End: 2024-04-18

## 2024-04-18 RX ORDER — LEVOCETIRIZINE DIHYDROCHLORIDE 5 MG/1
5 TABLET, FILM COATED ORAL DAILY
Status: DISCONTINUED | OUTPATIENT
Start: 2024-04-19 | End: 2024-04-19 | Stop reason: HOSPADM

## 2024-04-18 RX ORDER — ASPIRIN 81 MG/1
243 TABLET, CHEWABLE ORAL ONCE
Status: COMPLETED | OUTPATIENT
Start: 2024-04-18 | End: 2024-04-18

## 2024-04-18 RX ORDER — ASPIRIN 325 MG
325 TABLET ORAL ONCE
Status: COMPLETED | OUTPATIENT
Start: 2024-04-18 | End: 2024-04-18

## 2024-04-18 RX ORDER — HEPARIN SODIUM 10000 [USP'U]/100ML
0-5000 INJECTION, SOLUTION INTRAVENOUS CONTINUOUS
Status: DISCONTINUED | OUTPATIENT
Start: 2024-04-18 | End: 2024-04-18

## 2024-04-18 RX ORDER — IOPAMIDOL 755 MG/ML
INJECTION, SOLUTION INTRAVASCULAR
Status: DISCONTINUED | OUTPATIENT
Start: 2024-04-18 | End: 2024-04-18 | Stop reason: HOSPADM

## 2024-04-18 RX ORDER — ACETAMINOPHEN 650 MG/1
650 SUPPOSITORY RECTAL EVERY 4 HOURS PRN
Status: DISCONTINUED | OUTPATIENT
Start: 2024-04-18 | End: 2024-04-19 | Stop reason: HOSPADM

## 2024-04-18 RX ORDER — LIDOCAINE 40 MG/G
CREAM TOPICAL
Status: CANCELLED | OUTPATIENT
Start: 2024-04-18

## 2024-04-18 RX ORDER — AMOXICILLIN 250 MG
2 CAPSULE ORAL 2 TIMES DAILY PRN
Status: DISCONTINUED | OUTPATIENT
Start: 2024-04-18 | End: 2024-04-19 | Stop reason: HOSPADM

## 2024-04-18 RX ORDER — HEPARIN SODIUM 1000 [USP'U]/ML
INJECTION, SOLUTION INTRAVENOUS; SUBCUTANEOUS
Status: DISCONTINUED | OUTPATIENT
Start: 2024-04-18 | End: 2024-04-18 | Stop reason: HOSPADM

## 2024-04-18 RX ORDER — LORAZEPAM 0.5 MG/1
0.5 TABLET ORAL
Status: DISCONTINUED | OUTPATIENT
Start: 2024-04-18 | End: 2024-04-18 | Stop reason: HOSPADM

## 2024-04-18 RX ORDER — ONDANSETRON 4 MG/1
4 TABLET, ORALLY DISINTEGRATING ORAL EVERY 6 HOURS PRN
Status: DISCONTINUED | OUTPATIENT
Start: 2024-04-18 | End: 2024-04-19 | Stop reason: HOSPADM

## 2024-04-18 RX ORDER — ATORVASTATIN CALCIUM 80 MG/1
80 TABLET, FILM COATED ORAL DAILY
Status: DISCONTINUED | OUTPATIENT
Start: 2024-04-18 | End: 2024-04-19 | Stop reason: HOSPADM

## 2024-04-18 RX ORDER — ASPIRIN 81 MG/1
324 TABLET, CHEWABLE ORAL ONCE
Status: COMPLETED | OUTPATIENT
Start: 2024-04-18 | End: 2024-04-18

## 2024-04-18 RX ORDER — ACETAMINOPHEN 325 MG/1
650 TABLET ORAL EVERY 4 HOURS PRN
Status: DISCONTINUED | OUTPATIENT
Start: 2024-04-18 | End: 2024-04-19 | Stop reason: HOSPADM

## 2024-04-18 RX ORDER — LORAZEPAM 2 MG/ML
0.5 INJECTION INTRAMUSCULAR
Status: DISCONTINUED | OUTPATIENT
Start: 2024-04-18 | End: 2024-04-18 | Stop reason: HOSPADM

## 2024-04-18 RX ORDER — GUAIFENESIN/DEXTROMETHORPHAN 100-10MG/5
10 SYRUP ORAL EVERY 4 HOURS PRN
Status: DISCONTINUED | OUTPATIENT
Start: 2024-04-18 | End: 2024-04-19 | Stop reason: HOSPADM

## 2024-04-18 RX ADMIN — ASPIRIN 81 MG CHEWABLE TABLET 243 MG: 81 TABLET CHEWABLE at 11:49

## 2024-04-18 RX ADMIN — METOPROLOL TARTRATE 25 MG: 25 TABLET, FILM COATED ORAL at 08:15

## 2024-04-18 RX ADMIN — METOPROLOL TARTRATE 25 MG: 25 TABLET, FILM COATED ORAL at 01:53

## 2024-04-18 RX ADMIN — METOPROLOL TARTRATE 25 MG: 25 TABLET, FILM COATED ORAL at 20:46

## 2024-04-18 RX ADMIN — ATORVASTATIN CALCIUM 80 MG: 80 TABLET, FILM COATED ORAL at 08:15

## 2024-04-18 RX ADMIN — ASPIRIN 81 MG CHEWABLE TABLET 324 MG: 81 TABLET CHEWABLE at 01:53

## 2024-04-18 ASSESSMENT — ACTIVITIES OF DAILY LIVING (ADL)
ADLS_ACUITY_SCORE: 21
ADLS_ACUITY_SCORE: 35
ADLS_ACUITY_SCORE: 21

## 2024-04-18 ASSESSMENT — ENCOUNTER SYMPTOMS
HEADACHES: 0
DIZZINESS: 0

## 2024-04-18 NOTE — PHARMACY-ADMISSION MEDICATION HISTORY
"Pharmacist Admission Medication History    Admission medication history is complete. The information provided in this note is only as accurate as the sources available at the time of the update.    Information Source(s): Patient and CareEverywhere/SureScripts via in-person    Pertinent Information:   -Avastin: med on PTA list marked initially as \"patient not taking.\" Per chart review, patient last received this medication in January. Patient reports he missed the next dose due to not feeling well and states he \"stopping taking\" it. Upon further discussion, it is unclear if patient is just not taking it temporarily and will eventually resume or if he no longer wants/plans to take it. Left on PTA med list. Unable to verify concentration used or dose received in Jan 2024. Per chart review note on 1/4 when patient received injection, it had a frequency on \"q 6 weeks\" in their records.  -losartan: no fill records in [employer]. Per chart review, appears this was a new prescription on 4/17. Patient reports he did not have a chance to pick this up yet. Marked as \"not started\"    Changes made to PTA medication list:  Added: vitamin C, vitamin D  Deleted: fluticasone, furosemide  Changed: levocetirizine (added frequency), losartan (marked as \"not started,\" see note above), bevacizumab (2.5 mg/0.1 mL SOSY --> intravitreal inj (unable to confirm concentration of product or dose received) per chart review, see note above, unclear if patient will continue to receive this medication)    Allergies reviewed with patient and updates made in EHR: no, already reviewed    Medication History Completed By: Luba Armstrong RPH 4/18/2024 9:22 AM    Prior to Admission medications    Medication Sig Last Dose Taking? Auth Provider Long Term End Date   Ascorbic Acid (VITAMIN C PO) Take by mouth as needed (when feeling sick/has a cold) Dose unclear. Possibly 500-1000 mg or 2-3 tabs  at PRN Yes Unknown, Entered By History     levocetirizine " (XYZAL) 5 MG tablet Take 5 mg by mouth daily 4/17/2024 Yes Reported, Patient     VITAMIN D PO Take by mouth as needed (when feeling sick/has a cold) Dose unknown  at PRN Yes Unknown, Entered By History     bevacizumab (AVASTIN) 25 mg/mL intravitreal inj 4/18/24: Unclear if patient will be continuing this medication. Unable to verify concentration or dose. Left eye per chart review Jan 2024  Unknown, Entered By History Yes    losartan (COZAAR) 25 MG tablet Take 1 tablet (25 mg) by mouth daily not started  Karlos Ashraf MD Yes

## 2024-04-18 NOTE — PROGRESS NOTES
RECEIVING UNIT ED HANDOFF REVIEW    ED Nurse Handoff Report was reviewed by: Galileo Lo RN on April 18, 2024 at 12:56 AM

## 2024-04-18 NOTE — PLAN OF CARE
Pleasant gentlemen. Pt aox4, SBA, RA and full code. Tele SB/SR w/pvcs. Pt denies CP and SOB. Heparin gtt 650 with recheck at 1200.    Plan: NPO at midnight and cards consult.

## 2024-04-18 NOTE — PROGRESS NOTES
[facility]    Medicine Progress Note - Hospitalist Service    Date of Admission:  4/17/2024    Assessment & Plan   Tom Summers is a markedly pleasant 89 year old gentleman with past medical history that is most notable for hypertension, who presents with progressive exertional dyspnea, and is found to have suspected ACS.        Possible ACS  ? Untreated/new dx hypertension  Of note, Mr. Summers has never smoked. He has hypertension and hyperlipidemia, not currently on treatment. He does not as yet have a known history of CAD, CHF or arrhythmia. He reports a history of several years of intermittent exertional dyspnea. A Lexiscan in 2019 was reportedly negative for inducible ischemia. The dyspnea has progressed recently in severity and he saw [facility] today in clinic, and was prescribed Lasix and Losartan, and consideration was made for possible outpatient catheterization; and subsequently in the day he was referred to the ED for elevated D-Dimer. CT PE negative for PE or other acute pathology. Trops minimally elevated and flat - 22 24 25. BNP WNL. ACS vs CHF exists in differential, among others. Cardiology discussed upon adm when patient in ED, heparin gtt started.  - npo for angiogram with cardiology 4/18  - continue heparin gtt  - prn nitroglycerin  - A1c 5.6%  - LDL pending  - ASA daily  - metop and atorvastatin initiated     Incidental 4 mm right upper lobe pulmonary nodule  Seen incidentally on CT, recommend close outpatient follow up at discharge.          Diet: NPO for Medical/Clinical Reasons Except for: Meds, Ice Chips    DVT Prophylaxis: on heparin gtt currently  [place] Catheter: Not present  Lines: None     Cardiac Monitoring: ACTIVE order. Indication: AMI (NSTEMI/ STEMI) (48 hours)  Code Status: Full Code      Clinically Significant Risk Factors Present on Admission                  # Hypertension: Home medication list includes antihypertensive(s)      # Overweight:  "Estimated body mass index is 27.28 kg/m  as calculated from the following:    Height as of this encounter: 1.676 m (5' 6\").    Weight as of this encounter: 76.7 kg (169 lb).              Disposition Plan     Medically Ready for Discharge: Anticipated Tomorrow             Bradley Montes MD  Hospitalist Service  [facility]  Securely message with [employer] (more info)  Text page via [employer] Paging/Directory   ______________________________________________________________________    Interval History   Care assumed today. Pt seen and examined. No chest pain now or recently. Worsening lerma confirmed. Recent cold symptoms but no fevers or chills, seems improved now - denies correlation with presenting complaint of LERMA. Discussed with cardiology in patient room.     Physical Exam   Vital Signs: Temp: 97.9  F (36.6  C) Temp src: Oral BP: 132/71 Pulse: 64   Resp: 16 SpO2: 97 % O2 Device: None (Room air)    Weight: 169 lbs 0 oz    Gen: NAD, pleasant  HEENT: EOMI, MMM  Resp: no focal crackles,  no wheezes, no increased work of resp  CV: S1S2 heard, reg rhythm, reg rate  Abdo: soft, nontender, nondistended, bowel sounds present  Ext: calves nontender, well perfused  Neuro: aa, conversant, moving ext, CN grossly intact, no facial asymmetry      Medical Decision Making       39 MINUTES SPENT BY ME on the date of service doing chart review, history, exam, documentation & further activities per the note.      Data     I have personally reviewed the following data over the past 24 hrs:    6.3  \   11.9 (L)   / 192     141 106 17.5 /  103 (H)   4.7 28 1.09 \     Trop: 25 (H) BNP: N/A     TSH: N/A T4: N/A A1C: 5.6       "

## 2024-04-18 NOTE — ED NOTES
Patient was able to walk with fast steady gait. SpO2 dropped to 83% after returning from the restroom. .
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
## 2024-04-18 NOTE — ED NOTES
[facility]  ED Nurse Handoff Report    ED Chief complaint: Shortness of Breath and Fatigue      ED Diagnosis:   Final diagnoses:   Exertional dyspnea   Nonspecific abnormal electrocardiogram (ECG) (EKG)       Code Status: for hospitalist to address.    Allergies: No Known Allergies    Patient Story: walked in thru triage c/o shortness of breath and fatigue. The patient reports that for the past two to three months he has been becoming short of breath and exhausted with any exertion. These symptoms have been worsening in recent weeks. He saw a cardiologist today who told him to go to the ED due to concern of a blood clot. His symptoms are worsened with any exertion, including standing up after sitting for a while.   Focused Assessment:  ambulatory, A&Ox4, respirations even and unlabored at rest but dyspneic and hypoxic after walking. VSS, pain free. Skin dry, warm, color wnl.   Results for orders placed or performed during the hospital encounter of 04/17/24   CT Chest Pulmonary Embolism w Contrast     Status: None    Narrative    EXAM: CT CHEST PULMONARY EMBOLISM W CONTRAST  LOCATION: [facility]  DATE: 4/17/2024    INDICATION: Worsening exertional dyspnea, elevated dimer  COMPARISON: Chest radiograph 02/18/2019  TECHNIQUE: CT chest pulmonary angiogram during arterial phase injection of IV contrast. Multiplanar reformats and MIP reconstructions were performed. Dose reduction techniques were used.   CONTRAST: 65 mL Isovue 370    FINDINGS:  ANGIOGRAM CHEST: Pulmonary arteries are normal caliber and negative for pulmonary emboli. Thoracic aorta is not well opacified and is indeterminate for dissection. No CT evidence of right heart strain.    LUNGS AND PLEURA: No focal airspace consolidation or pleural effusion. Minimal peripheral fibrosis in the lung bases. Incidental 4 mm right upper lobe nodule (series 5 image 96). Small calcified granulomas are also  noted.    MEDIASTINUM/AXILLAE: No suspicious lymphadenopathy. No thoracic aortic aneurysm.    CORONARY ARTERY CALCIFICATION: Mild.    UPPER ABDOMEN: Unremarkable.    MUSCULOSKELETAL: No acute bony abnormality.      Impression    IMPRESSION:  1.  No evidence of pulmonary embolus or other acute abnormality in the chest.  2.  Incidental 4 mm right upper lobe pulmonary nodule, consider follow-up described below.    REFERENCE:  Guidelines for Management of Incidental Pulmonary Nodules Detected on CT Images: From the Fleischner Society 2017.   Guidelines apply to incidental nodules in patients who are 35 years or older.  Guidelines do not apply to lung cancer screening, patients with immunosuppression, or patients with known primary cancer.    SINGLE NODULE  Nodule size <6 mm  Low-risk patients: No follow-up needed.  High-risk patients: Optional follow-up at 12 months.       Symptomatic Influenza A/B, RSV, & SARS-CoV2 PCR (COVID-19) Nasopharyngeal     Status: Normal    Specimen: Nasopharyngeal; Swab   Result Value Ref Range    Influenza A PCR Negative Negative    Influenza B PCR Negative Negative    RSV PCR Negative Negative    SARS CoV2 PCR Negative Negative    Narrative    Testing was performed using the Xpert Xpress CoV2/Flu/RSV Assay on the [employer] GeneXpert Instrument. This test should be ordered for the detection of SARS-CoV-2, influenza, and RSV viruses in individuals who meet clinical and/or epidemiological criteria. Test performance is unknown in asymptomatic patients. This test is for in vitro diagnostic use under the FDA EUA for laboratories certified under CLIA to perform high or moderate complexity testing. This test has not been FDA cleared or approved. A negative result does not rule out the presence of PCR inhibitors in the specimen or target RNA in concentration below the limit of detection for the assay. If only one viral target is positive but coinfection with multiple targets is suspected, the sample  should be re-tested with another FDA cleared, approved, or authorized test, if coinfection would change clinical management. This test was validated by the [facility]. These laboratories are certified under the Clinical Laboratory Improvement Amendments of 1988 (CLIA-88) as qualified to perform high complexity laboratory testing.   Troponin T, High Sensitivity     Status: Normal   Result Value Ref Range    Troponin T, High Sensitivity 22 <=22 ng/L   Troponin T, High Sensitivity     Status: Abnormal   Result Value Ref Range    Troponin T, High Sensitivity 24 (H) <=22 ng/L   CBC with platelets     Status: Abnormal   Result Value Ref Range    WBC Count 7.5 4.0 - 11.0 10e3/uL    RBC Count 4.12 (L) 4.40 - 5.90 10e6/uL    Hemoglobin 12.2 (L) 13.3 - 17.7 g/dL    Hematocrit 38.1 (L) 40.0 - 53.0 %    MCV 93 78 - 100 fL    MCH 29.6 26.5 - 33.0 pg    MCHC 32.0 31.5 - 36.5 g/dL    RDW 13.7 10.0 - 15.0 %    Platelet Count 217 150 - 450 10e3/uL   EKG 12-lead, tracing only     Status: None   Result Value Ref Range    Systolic Blood Pressure  mmHg    Diastolic Blood Pressure  mmHg    Ventricular Rate 89 BPM    Atrial Rate 89 BPM    OR Interval 162 ms    QRS Duration 82 ms     ms    QTc 450 ms    P Axis 88 degrees    R AXIS 6 degrees    T Axis -88 degrees    Interpretation ECG       Sinus rhythm  Nonspecific ST and T wave abnormality  Abnormal ECG  No previous ECGs available  Confirmed by GENERATED REPORT, COMPUTER ([id number]),  Inga Negrete ([id number]) on 4/17/2024 9:41:20 PM     Results for orders placed or performed during the hospital encounter of 04/17/24   Echocardiogram Complete     Status: None   Result Value Ref Range    Biplane LVEF 52%     Narrative    [id number]  [id number]  [id number]  [id number]^OLMAN^NERY^FRANCHESKA     [facility]  [facility]  [address]  [address]  [place] [address]  Phone [phone]  Fax [phone]     Name: SAMAN GENTILE  MRN: [id number]  : 1935  Study Date: 2024 01:24 PM  Age: 89 yrs  Gender: Male  Patient Location: [facility]  Reason For Study: Chest pain due to myocardial ischemia  Ordering Physician: NERY THURMAN  Referring Physician: NERY THURMAN  Performed By: Delia Langley     BSA: 1.9 m2  Height: 66 in  Weight: 171 lb  HR: 85  BP: 150/97 mmHg  ______________________________________________________________________________  Procedure  Complete Echo Adult. Optison (NDC #[id number]) given intravenously.     ______________________________________________________________________________  Interpretation Summary     There is borderline concentric left ventricular hypertrophy.  Biplane LVEF is 52%.  Septal motion is consistent with conduction abnormality.  Regional wall motion abnormalities cannot be excluded due to limited  visualization (lateral and inferolateral shadowing).  No significant valvular heart disease.  ______________________________________________________________________________  Left Ventricle  The left ventricle is normal in size. There is borderline concentric left  ventricular hypertrophy. Left ventricular diastolic function is indeterminate.  Biplane LVEF is 52%. Septal motion is consistent with conduction abnormality.  Regional wall motion abnormalities cannot be excluded due to limited  visualization.     Right Ventricle  The right ventricle is normal in size and function.     Atria  The left atrium is mildly dilated. Right atrial size is normal. There is no  color Doppler evidence of an atrial shunt.     Mitral Valve  The mitral valve leaflets are mildly thickened. There is mild mitral annular  calcification. There is borderline mitral valve prolapse. There is trace  mitral regurgitation.     Tricuspid Valve  There is trace tricuspid regurgitation. IVC diameter <2.1 cm collapsing >50%  with sniff suggests a normal RA pressure of 3 mmHg.     Aortic  Valve  There is trivial trileaflet aortic sclerosis. No aortic regurgitation is  present.     Pulmonic Valve  There is trace pulmonic valvular regurgitation.     Vessels  Normal size aorta. The aortic root is normal size.     Pericardium  There is no pericardial effusion.     Rhythm  Sinus rhythm was noted.     ______________________________________________________________________________  MMode/2D Measurements & Calculations  IVSd: 1.3 cm  LVIDd: 4.7 cm  LVIDs: 3.7 cm  LVPWd: 1.1 cm  FS: 21.3 %  LV mass(C)d: 212.0 grams  LV mass(C)dI: 113.3 grams/m2  Ao root diam: 3.3 cm  LA dimension: 4.2 cm     asc Aorta Diam: 3.2 cm  LA/Ao: 1.3  LVOT diam: 2.4 cm  LVOT area: 4.5 cm2  Ao root diam index Ht(cm/m): 2.0  Ao root diam index BSA (cm/m2): 1.8  Asc Ao diam index BSA (cm/m2): 1.7  Asc Ao diam index Ht(cm/m): 1.9  EF Biplane: 52.4 %  RWT: 0.47  TAPSE: 2.1 cm     Doppler Measurements & Calculations  MV E max curtis: 76.1 cm/sec  MV A max curtis: 54.0 cm/sec  MV E/A: 1.4  MV dec time: 0.18 sec  Ao V2 max: 144.0 cm/sec  Ao max P.0 mmHg  Ao V2 mean: 98.1 cm/sec  Ao mean P.0 mmHg  Ao V2 VTI: 27.2 cm  ROWENA(I,D): 2.5 cm2  ROWENA(V,D): 2.3 cm2  LV V1 max P.1 mmHg  LV V1 max: 72.8 cm/sec  LV V1 VTI: 14.8 cm  SV(LVOT): 67.0 ml  SI(LVOT): 35.8 ml/m2     PA acc time: 0.11 sec  Pulm A Revs Curtis: 0.37 cm/sec  AV Curtis Ratio (DI): 0.51  ROWENA Index (cm2/m2): 1.3  E/E' avg: 10.6  Lateral E/e': 10.6  Medial E/e': 10.6  RV S Curtis: 15.2 cm/sec     ______________________________________________________________________________  Report approved by: Alta Salcedo 2024 03:09 PM         Results for orders placed or performed in visit on 24   Comprehensive metabolic panel     Status: Abnormal   Result Value Ref Range    Sodium 139 135 - 145 mmol/L    Potassium 4.1 3.4 - 5.3 mmol/L    Carbon Dioxide (CO2) 21 (L) 22 - 29 mmol/L    Anion Gap 15 7 - 15 mmol/L    Urea Nitrogen 16.1 8.0 - 23.0 mg/dL    Creatinine 1.10 0.67 - 1.17 mg/dL     GFR Estimate 65 >60 mL/min/1.73m2    Calcium 9.1 8.8 - 10.2 mg/dL    Chloride 103 98 - 107 mmol/L    Glucose 105 (H) 70 - 99 mg/dL    Alkaline Phosphatase 69 40 - 150 U/L    AST 23 0 - 45 U/L    ALT 12 0 - 70 U/L    Protein Total 7.6 6.4 - 8.3 g/dL    Albumin 4.1 3.5 - 5.2 g/dL    Bilirubin Total 0.4 <=1.2 mg/dL   CBC with platelets     Status: Abnormal   Result Value Ref Range    WBC Count 7.8 4.0 - 11.0 10e3/uL    RBC Count 4.35 (L) 4.40 - 5.90 10e6/uL    Hemoglobin 13.1 (L) 13.3 - 17.7 g/dL    Hematocrit 40.3 40.0 - 53.0 %    MCV 93 78 - 100 fL    MCH 30.1 26.5 - 33.0 pg    MCHC 32.5 31.5 - 36.5 g/dL    RDW 13.6 10.0 - 15.0 %    Platelet Count 219 150 - 450 10e3/uL   D dimer quantitative     Status: Abnormal   Result Value Ref Range    D-Dimer Quantitative 1.56 (H) 0.00 - 0.50 ug/mL FEU    Narrative    This D-dimer assay is intended for use in conjunction with a clinical pretest probability assessment model to exclude pulmonary embolism (PE) and deep venous thrombosis (DVT) in outpatients suspected of PE or DVT. The cut-off value is 0.50 ug/mL FEU.    For patients 50 years of age or older, the application of age-adjusted cut-off values for D-Dimer may increase the specificity without significant effect on sensitivity. The literature suggested calculation age adjusted cut-off in ug/L = age in years x 10 ug/L. The results in this laboratory are reported as ug/mL rather than ug/L. The calculation for age adjusted cut off in ug/mL= age in years x 0.01 ug/mL. For example, the cut off for a 76 year old male is 78 x 0.01 ug/mL = 0.76 ug/mL (760 ug/L).    M Carlo et al. Age adjusted D-dimer cut-off levels to rule out pulmonary embolism: The ADJUST-PE Study. MER 2014;311:6643-4226.; BERTO Callejas et al. Diagnostic accuracy of conventional or age adjusted D-dimer cutoff values in older patients with suspected venous thromboembolism. Systemic review and meta-analysis. BMJ 2013:346:f2492.   N terminal pro BNP outpatient      Status: Normal   Result Value Ref Range    N Terminal Pro BNP Outpatient 1,576 0 - 1,800 pg/mL   Results for orders placed or performed during the hospital encounter of 04/17/24   XR Chest 2 Views     Status: None    Narrative    XR CHEST 2 VIEWS  4/17/2024 9:06 AM       INDICATION: Chest pain  COMPARISON: None       Impression    IMPRESSION: Negative chest.    ALEX STOLL MD         SYSTEM ID:  [id number]       Treatments and/or interventions provided: full cardiac monitoring.  Medications   heparin 25,000 units in 0.45% NaCl 250 mL ANTICOAGULANT infusion (950 Units/hr Intravenous $New Bag 4/17/24 2224)   iopamidol (ISOVUE-370) solution 65 mL (65 mLs Intravenous $Given 4/17/24 1829)   Saline Flush (90 mLs Intravenous $Given 4/17/24 1830)   heparin loading dose for LOW INTENSITY TREATMENT * Give BEFORE starting heparin infusion (4,750 Units Intravenous $Given 4/17/24 2223)     Patient's response to treatments and/or interventions: VSS, pain free, comfortably resting in stretcher, does not seem to be in any distress.     To be done/followed up on inpatient unit:  cath lab in AM?    Does this patient have any cognitive concerns?:  n/a    Activity level - Baseline/Home:  Independent  Activity Level - Current:   Independent    Patient's Preferred language: English   Needed?: No    Isolation: None  Infection: Not Applicable  Patient tested for COVID 19 prior to admission: yes  Bariatric?: No    Vital Signs:   Vitals:    04/17/24 2130 04/17/24 2145 04/17/24 2155 04/17/24 2220   BP:    (!) 169/99   Pulse: 96 90 93 93   Resp:    16   Temp:       TempSrc:       SpO2: 95% 96% 96% 99%   Weight:   78.9 kg (174 lb)    Height:           Cardiac Rhythm:     Was the PSS-3 completed:   Yes  What interventions are required if any?    Family Comments: wife at bedside  OBS brochure/video discussed/provided to patient/family: N/A  For the majority of the shift this patient's behavior was Green.   Behavioral interventions  performed were n/a.    ED NURSE PHONE NUMBER: [phone]

## 2024-04-18 NOTE — PROGRESS NOTES
Patient arrived from the cath lab. There was no intervention; Hep gtt was discontinued.     VSS; on RA; RLE site is CDI; CMS intact.   Tom is in no acute distress; tolerated diet.
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
## 2024-04-19 ENCOUNTER — TELEPHONE (OUTPATIENT)
Dept: CARDIOLOGY | Facility: CLINIC | Age: 89
End: 2024-04-19
Payer: COMMERCIAL

## 2024-04-19 ENCOUNTER — APPOINTMENT (OUTPATIENT)
Dept: PHYSICAL THERAPY | Facility: CLINIC | Age: 89
DRG: 286 | End: 2024-04-19
Attending: HOSPITALIST
Payer: COMMERCIAL

## 2024-04-19 VITALS
BODY MASS INDEX: 27.18 KG/M2 | HEART RATE: 86 BPM | DIASTOLIC BLOOD PRESSURE: 77 MMHG | SYSTOLIC BLOOD PRESSURE: 141 MMHG | WEIGHT: 169.1 LBS | TEMPERATURE: 98 F | HEIGHT: 66 IN | RESPIRATION RATE: 15 BRPM | OXYGEN SATURATION: 96 %

## 2024-04-19 DIAGNOSIS — I25.9 CHEST PAIN DUE TO MYOCARDIAL ISCHEMIA, UNSPECIFIED ISCHEMIC CHEST PAIN TYPE: ICD-10-CM

## 2024-04-19 DIAGNOSIS — R06.09 EXERTIONAL DYSPNEA: Primary | ICD-10-CM

## 2024-04-19 DIAGNOSIS — I50.30 HEART FAILURE WITH PRESERVED EJECTION FRACTION, NYHA CLASS I (H): ICD-10-CM

## 2024-04-19 DIAGNOSIS — R94.31 NONSPECIFIC ABNORMAL ELECTROCARDIOGRAM (ECG) (EKG): ICD-10-CM

## 2024-04-19 PROBLEM — E78.5 HYPERLIPIDEMIA LDL GOAL <100: Status: ACTIVE | Noted: 2024-04-19

## 2024-04-19 LAB
CHOLEST SERPL-MCNC: 161 MG/DL
HDLC SERPL-MCNC: 53 MG/DL
LDLC SERPL CALC-MCNC: 95 MG/DL
NONHDLC SERPL-MCNC: 108 MG/DL
TRIGL SERPL-MCNC: 63 MG/DL

## 2024-04-19 PROCEDURE — 97161 PT EVAL LOW COMPLEX 20 MIN: CPT | Mod: GP

## 2024-04-19 PROCEDURE — 250N000013 HC RX MED GY IP 250 OP 250 PS 637: Performed by: NURSE PRACTITIONER

## 2024-04-19 PROCEDURE — 250N000013 HC RX MED GY IP 250 OP 250 PS 637: Performed by: INTERNAL MEDICINE

## 2024-04-19 PROCEDURE — 99239 HOSP IP/OBS DSCHRG MGMT >30: CPT | Performed by: HOSPITALIST

## 2024-04-19 PROCEDURE — 99232 SBSQ HOSP IP/OBS MODERATE 35: CPT | Performed by: NURSE PRACTITIONER

## 2024-04-19 PROCEDURE — 97530 THERAPEUTIC ACTIVITIES: CPT | Mod: GP

## 2024-04-19 PROCEDURE — 97110 THERAPEUTIC EXERCISES: CPT | Mod: GP

## 2024-04-19 RX ORDER — METOPROLOL SUCCINATE 25 MG/1
25 TABLET, EXTENDED RELEASE ORAL DAILY
Status: DISCONTINUED | OUTPATIENT
Start: 2024-04-20 | End: 2024-04-19 | Stop reason: HOSPADM

## 2024-04-19 RX ORDER — LOSARTAN POTASSIUM 100 MG/1
100 TABLET ORAL DAILY
Status: DISCONTINUED | OUTPATIENT
Start: 2024-04-19 | End: 2024-04-19

## 2024-04-19 RX ORDER — LOSARTAN POTASSIUM 50 MG/1
50 TABLET ORAL DAILY
Qty: 30 TABLET | Refills: 0 | Status: SHIPPED | OUTPATIENT
Start: 2024-04-20 | End: 2024-06-05

## 2024-04-19 RX ORDER — LOSARTAN POTASSIUM 50 MG/1
50 TABLET ORAL DAILY
Status: DISCONTINUED | OUTPATIENT
Start: 2024-04-19 | End: 2024-04-19 | Stop reason: HOSPADM

## 2024-04-19 RX ORDER — ATORVASTATIN CALCIUM 40 MG/1
40 TABLET, FILM COATED ORAL DAILY
Qty: 30 TABLET | Refills: 0 | Status: SHIPPED | OUTPATIENT
Start: 2024-04-20 | End: 2024-06-05

## 2024-04-19 RX ORDER — METOPROLOL SUCCINATE 25 MG/1
25 TABLET, EXTENDED RELEASE ORAL DAILY
Qty: 30 TABLET | Refills: 0 | Status: SHIPPED | OUTPATIENT
Start: 2024-04-20 | End: 2024-06-05

## 2024-04-19 RX ORDER — ASPIRIN 81 MG/1
81 TABLET, CHEWABLE ORAL DAILY
Qty: 30 TABLET | Refills: 0 | Status: SHIPPED | OUTPATIENT
Start: 2024-04-20 | End: 2024-05-20

## 2024-04-19 RX ADMIN — ATORVASTATIN CALCIUM 80 MG: 80 TABLET, FILM COATED ORAL at 09:49

## 2024-04-19 RX ADMIN — LOSARTAN POTASSIUM 50 MG: 50 TABLET, FILM COATED ORAL at 09:49

## 2024-04-19 RX ADMIN — ASPIRIN 81 MG CHEWABLE TABLET 81 MG: 81 TABLET CHEWABLE at 09:49

## 2024-04-19 RX ADMIN — LEVOCETIRIZINE DIHYDROCHLORIDE 5 MG: 5 TABLET ORAL at 09:48

## 2024-04-19 RX ADMIN — METOPROLOL TARTRATE 25 MG: 25 TABLET, FILM COATED ORAL at 09:49

## 2024-04-19 ASSESSMENT — ACTIVITIES OF DAILY LIVING (ADL)
ADLS_ACUITY_SCORE: 21
DEPENDENT_IADLS:: INDEPENDENT
ADLS_ACUITY_SCORE: 21
ADLS_ACUITY_SCORE: 21
ADLS_ACUITY_SCORE: 19
ADLS_ACUITY_SCORE: 19

## 2024-04-19 NOTE — DISCHARGE SUMMARY
"[facility]  Hospitalist Discharge Summary      Date of Admission:  4/17/2024  Date of Discharge:  4/19/2024  Discharging Provider: Bradley Montes MD  Discharge Service: Hospitalist Service    Discharge Diagnoses   Progressive dyspnea on exertion  Mild troponin elevation - likely demand ischemia due to hypertension  Uncontrolled hypertension  Mild systolic HF, chronicity not clear, possibly acute  Hyperlipidemia  Incidental 4 mm right upper lobe pulmonary nodule      Clinically Significant Risk Factors     # Overweight: Estimated body mass index is 27.29 kg/m  as calculated from the following:    Height as of this encounter: 1.676 m (5' 6\").    Weight as of this encounter: 76.7 kg (169 lb 1.6 oz).       Follow-ups Needed After Discharge   Follow-up Appointments     Follow-up and recommended labs and tests       PCP for hospitalization follow up  Cardiology as they schedule - 1 month            Unresulted Labs Ordered in the Past 30 Days of this Admission       No orders found from 3/18/2024 to 4/18/2024.        These results will be followed up by NA    Discharge Disposition   Discharged to home  Condition at discharge: Stable    Hospital Course   Tom Summers is a markedly pleasant 89 year old gentleman with past medical history that is most notable for hypertension, who presents with progressive exertional dyspnea, and is found to have suspected ACS.        Dyspnea on exertion  Likely demand ischemia  Untreated hypertension  Mild systolic HF, chronicity not clear, possibly acute  Of note, Mr. Summers has never smoked. He has hypertension and hyperlipidemia, not currently on treatment. He does not as yet have a known history of CAD, CHF or arrhythmia. He reports a history of several years of intermittent exertional dyspnea. A Lexiscan in 2019 was reportedly negative for inducible ischemia. The dyspnea has progressed recently in severity and he saw [facility] today in clinic, and " was prescribed Lasix and Losartan, and consideration was made for possible outpatient catheterization; and subsequently in the day he was referred to the ED for elevated D-Dimer. CT PE negative for PE or other acute pathology. Trops minimally elevated and flat - 22 24 25. BNP WNL. ACS vs CHF exists in differential, among others. Cardiology discussed upon adm when patient in ED, heparin gtt started.  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 Discussed with patient and spouse on day of discharge.  Confirmed he has never smoked and has no personal or family history of lung cancer. Discussed with him that he can discuss with PCP - could consider CT follow up in 1 year though he appears to fall in low risk category. They voiced understanding.    Consultations This Hospital Stay   PHARMACY IP CONSULT  CARE MANAGEMENT / SOCIAL WORK IP CONSULT  CARDIAC REHAB IP CONSULT  CARDIOLOGY IP CONSULT  PHARMACY IP CONSULT  PHARMACY IP CONSULT  SMOKING CESSATION PROGRAM IP CONSULT    Code Status   Full Code    Time Spent on this Encounter   I, Bradley Montes MD, personally saw the patient today and spent greater than 30 minutes discharging this patient.       Bradley Montes MD  [facility]  [address] [place] [address], [address]  [facility] [address]  Phone: [phone]  ______________________________________________________________________    Physical Exam   Vital Signs: Temp: 98  F (36.7  C) Temp src: Oral BP: (!) 141/77 Pulse: 86   Resp: 15  SpO2: 96 % O2 Device: None (Room air) Oxygen Delivery: 2 LPM  Weight: 169 lbs 1.6 oz    Gen: NAD, pleasant, up in chair, spouse at bedside  HEENT: EOMI, MMM  Resp: no focal crackles,  no wheezes, no increased work of resp  CV: S1S2 heard, reg rhythm, reg rate  Abdo: soft, nontender, nondistended, bowel sounds present  Ext: calves nontender, well perfused  Neuro: aa, conversant, moving ext, CN grossly intact, no facial asymmetry         Primary Care Physician   Physician No Ref-Primary    Discharge Orders      Follow-Up with Cardiology JEN      Brief Discharge Instructions    Do NOT stop your aspirin or platelet inhibitor unless directed by your Cardiologist.  These medications help to prevent platelets in your blood from sticking together and forming a clot.  Examples of these medications are:  Ticagrelor (Brilinta), Clopidigrel (Plavix), Prasugrel (Effient)     When to call - Contact the Heart Clinic    You may experience symptoms that require follow-up before your scheduled appointment. Contact the Heart Clinic if you develop: Fever over 100.4o Fahrenheit, that lasts more than one day; Redness, heat, or pus at the puncture site; Change in color or temperature in your groin or leg.     When to call - Reasons to Call 911    If your groin starts to bleed or begins to swell suddenly after leaving the hospital, lie flat and apply firm pressure just above the puncture site for 15 minutes.  If bleeding continues, call 9-1-1.     Precautions - Lifting    NO lifting of more than 10 pounds for at least 3 days.  If you usually lift 50 pounds or more daily, talk with your Cardiologist.     Precautions - Household Activities    Avoid any hard work or tiring activities.  NO physical activity such as mowing the lawn, raking, vacuuming, changing sheets on your bed, snow shoveling, or using a .     Precautions - Active Sports Activities    Avoid any tiring sports activities.  This includes, yard work, jogging, biking,  bowling, swimming, tennis or golf, and sexual activity.     Precautions - Elective Dental Work    NO elective dental work for 6 weeks after receiving a stent.     Comfort and Pain Management - Pain after Surgery    Pain after surgery is normal and expected.  Your leg may be sore or stiff for a few days, and your pain will improve with time. You may take Tylenol or a pain medicine recommended by your Cardiologist.     Comfort and Pain Management - Bruising after Surgery    Bruising around the groin area is normal.  It may take 2-3 weeks for this to go away.  It is normal for the bruised area to turn green and/or yellow as it is healing.  A small lump may also be present and may last 2-3 months.     Activity - Daily Walking    During the day get up and walk around every 2 hours.     Activity - Light Household Activities    Light household activities are ok.     Activity - Elevate Legs    Elevate legs in between all activities.     Activity - Cardiac Rehab    You are encouraged to enroll in an Outpatient Cardiac Rehab program after discharge from the hospital.  Our Cardiac Rehab staff may visit briefly with you while you're in the hospital.  If they miss you, someone will contact you after you are home.     Return to Driving    Driving is NOT permitted for 24 hours after surgery     Return to work    You may return to work after 72 hours if you are feeling well and your job does not involve heavy lifting.     Dressing Removal    You may take off the dressing on your groin the day after your procedure.     Incision Care    Keep the incision area dry and clean.  You do not need to use a bandage on your incision.     Shower / Bathing    It is ok to shower with regular soap. Pat dry, do not rub. No tub bath for 3 days. No swimming in a pool or hot tub immersion for 1 week     Reason for your hospital stay    Progressive dyspnea on exertion     Follow-up and recommended labs and tests     PCP for hospitalization follow  up  Cardiology as they schedule - 1 month     Activity    Your activity upon discharge: activity as tolerated     Discharge Instructions    Continue your medications as below and follow up with your regular doctor and your cardiology team. If possible, it would be prudent to check your BP at home and record the levels so you can relay with your outpatient care team.  As we discussed, there was a small pulmonary nodule seen on CT imaging - you appear to be in a low risk category that may not need to have repeat CT scan but you should discuss with your outpatient team and could consider 1 year CT follow up.     Diet    Follow this diet upon discharge: Orders Placed This Encounter      Regular Diet Adult       Significant Results and Procedures   Most Recent 3 CBC's:  Recent Labs   Lab Test 04/18/24  0405 04/18/24  0144 04/17/24  2225   WBC 6.3 6.6 7.5   HGB 11.9* 11.6* 12.2*   MCV 93 92 93    193 217     Most Recent 3 BMP's:  Recent Labs   Lab Test 04/18/24  0405 04/17/24  0847    139   POTASSIUM 4.7 4.1   CHLORIDE 106 103   CO2 28 21*   BUN 17.5 16.1   CR 1.09 1.10   ANIONGAP 7 15   GABBY 8.6* 9.1   * 105*     Most Recent 3 Troponin's:No lab results found.  Most Recent 3 BNP's:  Recent Labs   Lab Test 04/17/24  0847   NTBNP 1,576     Most Recent D-dimer:  Recent Labs   Lab Test 04/17/24  0847   DD 1.56*     Most Recent Cholesterol Panel:  Recent Labs   Lab Test 04/18/24  0405   CHOL 161   LDL 95   HDL 53   TRIG 63     Most Recent Hemoglobin A1c:  Recent Labs   Lab Test 04/17/24  2225   A1C 5.6   ,   Results for orders placed or performed during the hospital encounter of 04/17/24   CT Chest Pulmonary Embolism w Contrast    Narrative    EXAM: CT CHEST PULMONARY EMBOLISM W CONTRAST  LOCATION: [facility]  DATE: 4/17/2024    INDICATION: Worsening exertional dyspnea, elevated dimer  COMPARISON: Chest radiograph 02/18/2019  TECHNIQUE: CT chest pulmonary angiogram during arterial  phase injection of IV contrast. Multiplanar reformats and MIP reconstructions were performed. Dose reduction techniques were used.   CONTRAST: 65 mL Isovue 370    FINDINGS:  ANGIOGRAM CHEST: Pulmonary arteries are normal caliber and negative for pulmonary emboli. Thoracic aorta is not well opacified and is indeterminate for dissection. No CT evidence of right heart strain.    LUNGS AND PLEURA: No focal airspace consolidation or pleural effusion. Minimal peripheral fibrosis in the lung bases. Incidental 4 mm right upper lobe nodule (series 5 image 96). Small calcified granulomas are also noted.    MEDIASTINUM/AXILLAE: No suspicious lymphadenopathy. No thoracic aortic aneurysm.    CORONARY ARTERY CALCIFICATION: Mild.    UPPER ABDOMEN: Unremarkable.    MUSCULOSKELETAL: No acute bony abnormality.      Impression    IMPRESSION:  1.  No evidence of pulmonary embolus or other acute abnormality in the chest.  2.  Incidental 4 mm right upper lobe pulmonary nodule, consider follow-up described below.    REFERENCE:  Guidelines for Management of Incidental Pulmonary Nodules Detected on CT Images: From the Fleischner Society 2017.   Guidelines apply to incidental nodules in patients who are 35 years or older.  Guidelines do not apply to lung cancer screening, patients with immunosuppression, or patients with known primary cancer.    SINGLE NODULE  Nodule size <6 mm  Low-risk patients: No follow-up needed.  High-risk patients: Optional follow-up at 12 months.       Cardiac Catheterization    Narrative      Prox LAD to Mid LAD lesion is 50% stenosed.    1st Diag lesion is 50% stenosed.    Left ventricular filling pressures are normal.    Hemodynamic data has been modified in Epic per physician review.    1.  No obstructive CAD on coronary angiography with serial mild to   moderate lesions in the proximal LAD that was found to be hemodynamically   insignificant by IFR (IFR 0.94)  2.  Normal left-sided filling pressures (LVEDP 15  mmHg)         Discharge Medications   Current Discharge Medication List        START taking these medications    Details   aspirin (ASA) 81 MG chewable tablet Take 1 tablet (81 mg) by mouth daily for 30 days  Qty: 30 tablet, Refills: 0    Associated Diagnoses: Elevated troponin; Benign essential hypertension      atorvastatin (LIPITOR) 40 MG tablet Take 1 tablet (40 mg) by mouth daily for 30 days  Qty: 30 tablet, Refills: 0    Associated Diagnoses: Hyperlipidemia LDL goal <100      metoprolol succinate ER (TOPROL XL) 25 MG 24 hr tablet Take 1 tablet (25 mg) by mouth daily for 30 days  Qty: 30 tablet, Refills: 0    Associated Diagnoses: Benign essential hypertension           CONTINUE these medications which have CHANGED    Details   losartan (COZAAR) 50 MG tablet Take 1 tablet (50 mg) by mouth daily for 30 days  Qty: 30 tablet, Refills: 0    Associated Diagnoses: Benign essential hypertension           CONTINUE these medications which have NOT CHANGED    Details   Ascorbic Acid (VITAMIN C PO) Take by mouth as needed (when feeling sick/has a cold) Dose unclear. Possibly 500-1000 mg or 2-3 tabs      levocetirizine (XYZAL) 5 MG tablet Take 5 mg by mouth daily      VITAMIN D PO Take by mouth as needed (when feeling sick/has a cold) Dose unknown      bevacizumab (AVASTIN) 25 mg/mL intravitreal inj 4/18/24: Unclear if patient will be continuing this medication. Unable to verify concentration or dose. Left eye per chart review           Allergies   No Known Allergies

## 2024-04-19 NOTE — PLAN OF CARE
phi Howell. Worked with PT, up by self in room. Pt has no observable signs of pain, states as having no pain. Pt consulted today by CARDS and Dr. Montes, cleared to discharge home with SO, pt prefers to walk.     Pt and spouse given discharge paperwork. Questions encouraged and answered.

## 2024-04-19 NOTE — PLAN OF CARE
Pleasant gentlemen. Pt aox4, SBA, RA/2LNC and full code. Tele SR. Pt denies CP. Pt experienced SOB overnight and added 2LNC.  Pt had L&R HC. No intervention and normal filling pressures.  Rt groin soft, CDI and CMS intact.    Plan: Possible discharge tomorrow.

## 2024-04-19 NOTE — PLAN OF CARE
Physical Therapy Discharge Summary    Reason for therapy discharge:    All goals and outcomes met, no further needs identified.    Progress towards therapy goal(s). See goals on Care Plan in Epic electronic health record for goal details.  Goals met    Therapy recommendation(s):    No further therapy is recommended. Pt at or near baseline mobility. Able to tolerate extended ambulation w/ good tolerance and only small amount of SOB. O2 sats maintaing well. Anticipate when medically ready Pt can DC home w/ assist from spouse as needed. All acute CR goals met will sign off.  PT Brief overview of current status: Independent    Recommendation above provided by last treating therapist.

## 2024-04-19 NOTE — PROGRESS NOTES
[facility] Cardiology Progress Note  Date of Service: 04/19/2024    Assessment & Plan   Tom Summers is a 89 year old male admitted on 4/17/2024 with progressive dyspnea exertion was noted to have mild troponin elevation.    Interval History:   No acute issues overnight. He remains hypertensive with SBP > 150. Stable from a cardiac standpoint.     Assessment:   Progressive dyspnea on exertion, etiology unclear  Echocardiogram showed mildly reduced LV function with an EF of 50%, no valvular pathology.  Coronary angiogram showed mild nonobstructive coronary disease.  Elevated d-dimer, CT negative for PE.   Symptoms likely related to uncontrolled hypertension.    Mild troponin elevation, without significant delta  Likely demand in the setting of hypertension.    Hypertension, previously untreated  Remains uncontrolled on losartan 25 mg daily.    Hyperlipidemia  On atorvastatin 40 mg daily.    Mild LV dysfunction  TTE noted newly reduced LV function with an EF of 50% with borderline concentric LVH.  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  On PTA losartan 25 mg daily and Lopressor 25 mg twice daily.      Plan:  Increase losartan to 50 mg daily to further optimize his blood pressure.  Transition to Toprol-XL 25 mg daily.  Will defer further optimization of hypertensive regimen to outpatient setting.  Follow-up with cardiology JEN in about 1 month at [facility].    Palma Klein, CNP  Pager:  [phone]  (7am - 5pm, M-F)      Physical Exam   Temp: 98  F (36.7  C) Temp src: Oral BP: (!) 141/77 Pulse: 86   Resp: 15 SpO2: 96 % O2 Device: None (Room air) Oxygen Delivery: 2 LPM  Vitals:    04/17/24 2155 04/18/24 0112 04/19/24 0519   Weight: 78.9 kg (174 lb) 76.7 kg (169 lb) 76.7 kg (169 lb 1.6 oz)       Constitutional:   NAD   Skin:   Warm and dry   Head:   Nontraumatic   Neck:   no JVD   Lungs:   normal   Cardiovascular:   regular rate and rhythm   Abdomen:   Benign    Extremities and Back:   No edema   Neurological:   Grossly nonfocal       Medications   Current Facility-Administered Medications   Medication Dose Route Frequency Provider Last Rate Last Admin    medication instruction   Does not apply Continuous PRN Heriberto Driver MD        Reason ACE/ARB/ARNI order not selected   Other DOES NOT GO TO Heriberto Godwin MD         Current Facility-Administered Medications   Medication Dose Route Frequency Provider Last Rate Last Admin    aspirin (ASA) chewable tablet 81 mg  81 mg Oral Daily Heriberto Driver MD   81 mg at 04/19/24 0949    atorvastatin (LIPITOR) tablet 80 mg  80 mg Oral Daily Heriberto Driver MD   80 mg at 04/19/24 0949    levocetirizine (XYZAL) tablet 5 mg  5 mg Oral Daily Heriberto Driver MD   5 mg at 04/19/24 0948    losartan (COZAAR) tablet 50 mg  50 mg Oral Daily Palma Klein CNP   50 mg at 04/19/24 0949    metoprolol tartrate (LOPRESSOR) tablet 25 mg  25 mg Oral BID Heriberto Driver MD   25 mg at 04/19/24 0949    sodium chloride (PF) 0.9% PF flush 3 mL  3 mL Intracatheter Q8H Heriberto Driver MD   3 mL at 04/19/24 0951       Data     Most Recent 3 CBC's:  Recent Labs   Lab Test 04/18/24  0405 04/18/24  0144 04/17/24  2225   WBC 6.3 6.6 7.5   HGB 11.9* 11.6* 12.2*   MCV 93 92 93    193 217     Most Recent 3 BMP's:  Recent Labs   Lab Test 04/18/24  0405 04/17/24  0847    139   POTASSIUM 4.7 4.1   CHLORIDE 106 103   CO2 28 21*   BUN 17.5 16.1   CR 1.09 1.10   ANIONGAP 7 15   GABBY 8.6* 9.1   * 105*     Most Recent 3 Troponin's:No lab results found.  Most Recent 3 BNP's:  Recent Labs   Lab Test 04/17/24  0847   NTBNP 1,576     Most Recent D-dimer:  Recent Labs   Lab Test 04/17/24  0847   DD 1.56*     Most Recent Cholesterol Panel:  Recent Labs   Lab Test 04/18/24  0405   CHOL 161   LDL 95   HDL 53   TRIG 63         Medical Decision Making       30 MINUTES SPENT BY ME on the date of service doing chart review, history, exam, documentation & further activities per  "the note.        Clinically Significant Risk Factors                         # Overweight: Estimated body mass index is 27.29 kg/m  as calculated from the following:    Height as of this encounter: 1.676 m (5' 6\").    Weight as of this encounter: 76.7 kg (169 lb 1.6 oz)., PRESENT ON ADMISSION             "

## 2024-04-19 NOTE — PROGRESS NOTES
04/19/24 1001   Appointment Info   Signing Clinician's Name / Credentials (PT) Maximilian Reyes DPT   Living Environment   People in Home spouse   Current Living Arrangements house   Home Accessibility stairs within home   Number of Stairs, Within Home, Primary greater than 10 stairs   Stair Railings, Within Home, Primary railing on right side (ascending)   Transportation Anticipated car, drives self   Living Environment Comments Reports living in a house w/ spouse. No ALYSHA, howerver, flight of stairs to bedrom w/ R sided railings.   Self-Care   Usual Activity Tolerance good   Current Activity Tolerance good   Equipment Currently Used at Home none   Fall history within last six months no   Activity/Exercise/Self-Care Comment Baseline independent w/ mobility and ADLs w/o AD   General Information   Onset of Illness/Injury or Date of Surgery 04/17/24   Referring Physician Heriberto Driver MD   Patient/Family Therapy Goals Statement (PT) Return to home   Pertinent History of Current Problem (include personal factors and/or comorbidities that impact the POC) Tom Summers is a 89 year old male who was admitted on 4/17/2024 progressive dyspnea on exertion and was noted to have mild troponin elevation s/p angiogram w/o intervention   Existing Precautions/Restrictions no known precautions/restrictions   Cognition   Affect/Mental Status (Cognition) WFL   Orientation Status (Cognition) oriented x 4   Follows Commands (Cognition) WFL   Pain Assessment   Patient Currently in Pain No   Range of Motion (ROM)   ROM Comment WFLs for mobility and transfers no formal testing completed   Strength (Manual Muscle Testing)   Strength Comments WFLs for mobility and transfers no formal testing completed   Bed Mobility   Bed Mobility no deficits identified   Transfers   Transfers no deficits identified   Gait/Stairs (Locomotion)   [place] Level (Gait) independent   Balance   Balance Comments No overt LOB noted   Clinical Impression    Criteria for Skilled Therapeutic Intervention Yes, treatment indicated   PT Diagnosis (PT) Impaired ambulation   Influenced by the following impairments Impaired activity tolerance   Functional limitations due to impairments Impaired ADLs, IADLs and functional mobility   Clinical Presentation (PT Evaluation Complexity) stable   Clinical Presentation Rationale Clinical judgment   Clinical Decision Making (Complexity) low complexity   Planned Therapy Interventions (PT) progressive activity/exercise;risk factor education;home program guidelines   Risk & Benefits of therapy have been explained evaluation/treatment results reviewed;care plan/treatment goals reviewed;risks/benefits reviewed;current/potential barriers reviewed;participants voiced agreement with care plan;participants included;patient   PT Total Evaluation Time   PT Eval, Low Complexity Minutes (91954) 10   Physical Therapy Goals   PT Frequency One time eval and treatment only   PT Predicted Duration/Target Date for Goal Attainment 04/29/24   PT Goals Cardiac Phase 1   PT: Understanding of cardiac education to maximize quality of life, condition management, and health outcomes Patient;Verbalize;Demonstrate;Goal Met   PT: Perform aerobic activity with stable cardiovascular response intermittent;10 minutes;ambulation;Goal Met   PT: Functional/aerobic ambulation tolerance with stable cardiovascular response in order to return to home and community environment Independent;Greater than 300 feet;Goal Met   PT: Navigation of stairs simulating home set up with stable cardiovascular response in order to return to home and community environment Independent;Greater than 10 stairs;Rail on right;Goal Met   Interventions   Interventions Quick Adds Therapeutic Activity;Therapeutic Procedure;Cardiac Rehab   Therapeutic Procedure/Exercise   Ther. Procedure: strength, endurance, ROM, flexibillity Minutes (98076) 15   Symptoms Noted During/After Treatment shortness of  breath   Treatment Time Includes (CR Only) See specific exercise details intervention group(s);Monitoring of vital signs (see vital signs flowsheet for details)   Therapeutic Activity   Therapeutic Activities: dynamic activities to improve functional performance Minutes (35054) 15   Symptoms Noted During/After Treatment None   Treatment Detail/Skilled Intervention Pt seated in bedside chair at start of session. Agreeable to CR. Pt educated throughout session on signs of symptoms of intolerance, OMNI scale, risk factors and home walking program. Seated in bedside chair at end of session.   Ambulation   Workload Flat surface, fair speed   Duration (minutes) 11 minutes   Effort Scale 3   Symptoms Denies symptoms   Cardiovascular Response Hypertension at rest   Exercise Details Able to converse throughout, continous ambulation   Vital Signs Details See VSFS for further information   Stairs   Workload Good speed, 1 rail   Duration (minutes) 2 minutes   Effort Scale 3   Symptoms Denies symptoms   Cardiovascular Response Hypertension at rest   Exercise Details Able to converse, continous exercise   Vital Signs Details See VSFS for further information   Cardiac Education   Education Provided Diagnosis;Energy conservation;Home exercise program;OMNI Scale;Precautions;Resuming home activities;Risk factors;Signs and symptoms   Education Packet Given to Patient Yes   All Patient Education Handouts Reviewed with Patient and/or Family Yes   PT Discharge Planning   PT Plan DC   PT Discharge Recommendation (DC Rec) home with assist   PT Rationale for DC Rec Pt at or near baseline mobility. Able to tolerate extended ambulation w/ good tolerance and only small amount of SOB. O2 sats maintaing well. Anticipate when medically ready Pt can DC home w/ assist from spouse as needed. All acute CR goals met will sign off.   PT Brief overview of current status Independent   Total Session Time   Timed Code Treatment Minutes 30   Total Session  Time (sum of timed and untimed services) 40

## 2024-04-19 NOTE — CONSULTS
Care Management Initial Consult    General Information  Assessment completed with: Patient, Spouse or significant other, Shona Santos  Type of CM/SW Visit: Initial Assessment    Primary Care Provider verified and updated as needed: Yes (patient reports his PCP retired and has chosen not to find a new PCP at this time)   Readmission within the last 30 days: no previous admission in last 30 days      Reason for Consult: discharge planning  Advance Care Planning: Advance Care Planning Reviewed: other (see comments) (No document)          Communication Assessment  Patient's communication style: spoken language (English or Bilingual)    Hearing Difficulty or Deaf: no   Wear Glasses or Blind: no    Cognitive  Cognitive/Neuro/Behavioral: WDL                      Living Environment:   People in home: spouse  Spouse  Current living Arrangements: house      Able to return to prior arrangements: yes  Living Arrangement Comments: return home    Family/Social Support:  Care provided by: self, spouse/significant other  Provides care for: no one  Marital Status:   Wife  Shona       Description of Support System: Supportive, Involved    Support Assessment: Adequate family and caregiver support, Adequate social supports    Current Resources:   Patient receiving home care services: No     Community Resources: None  Equipment currently used at home: other (see comments) (has walking sticks)  Supplies currently used at home: None    Employment/Financial:  Employment Status: retired        Financial Concerns: none   Referral to Financial Worker: No       Does the patient's insurance plan have a 3 day qualifying hospital stay waiver?  No    Lifestyle & Psychosocial Needs:  Social Determinants of Health     Food Insecurity: Not on file   Depression: Not at risk (4/17/2024)    PHQ-2     PHQ-2 Score: 0   Housing Stability: Not on file   Tobacco Use: Low Risk  (4/17/2024)    Patient History     Smoking Tobacco Use: Never      "Smokeless Tobacco Use: Never     Passive Exposure: Not on file   Financial Resource Strain: High Risk (1/1/2022)    [facility], [facility]    Financial Resource Strain     Difficulty of Paying Living Expenses: Not on file     Difficulty of Paying Living Expenses: Not on file   Alcohol Use: Not on file   Transportation Needs: Not on file   Physical Activity: Not on file   Interpersonal Safety: Not on file   Stress: Not on file   Social Connections: Unknown (1/1/2022)    [facility] [place], [facility]    Social Connections     Frequency of Communication with Friends and Family: Not on file   Health Literacy: Not on file       Functional Status:  Prior to admission patient needed assistance:   Dependent ADLs:: Independent  Dependent IADLs:: Independent  Assesssment of Functional Status: At functional baseline    Mental Health Status:          Chemical Dependency Status:                Values/Beliefs:  Spiritual, Cultural Beliefs, Nondenominational Practices, Values that affect care: no               Additional Information:  Per care mangement consult for discharge planning, chart was reviewed. Patient is a pleasant 89 year old male that presents to [facility] on 4/17/24. According to the H & P \"past medical history that is most notable for hypertension, who presents with progressive exertional dyspnea, and is found to have suspected ACS\". Anticipated discharge date is 4/19/24 to home. Writer met with patient and spouse at bedside.    Patient shares that he and his spouse live in a single family home in [place], [place]. Patient reports no stairs to enter and then a full flight of 13 stairs to get upstairs to the loft with the bedroom. Patient states he is indepdnent at baseline with all Adl's/IADl's. Spouse completes indoor household tasks and patient " does outdoor lawn care. He still drives and will use walking sticks when he is out in the community. Patient denied any needs at discharge and is eager to return home. Writer stated she would relay this to the bedside RN. Patient states his PCP has retired and has not followed up with a PCP, however, [place].     At this time, no needs are identified.     JULIANA Bazan, [facility]   Social Work   [facility]

## 2024-04-19 NOTE — TELEPHONE ENCOUNTER
MAIK for review    Other labs  CN+MO, CBC, NT Pro BNP and elevqated D Dimer were messaged to Dr. Ashraf and were reviewed.     FLP's results noted. Was currently in Hospital 4-17-24-to 4-19-24.  No post hospital OV scheduled at this time.     Last Dr. Ashraf OV 4-17-24   As we discussed, the differential diagnosis for her symptoms is quite broad.  This would include hematological issues such as anemia, noncardiac etiologies such as lung disease or pulmonary emboli, and cardiac issues such as left ventricular dysfunction, pulmonary hypertension and/or obstructive coronary artery disease.  Although his symptoms are not typical of an arrhythmic etiology, this is also in the differential.     At this point in time I recommended we obtain comprehensive blood work to evaluate for these potential underlying etiologies as well as a chest x-ray.  An echocardiogram will be scheduled, and I have recommended that we also schedule coronary angiography given the progressive exertional nature of his symptoms.  I have discussed the indications, risks and benefits of coronary angiography in the setting to the patient in detail who is agreeable with proceeding.  Obviously if significant abnormalities are identified prior to his angiogram that suggest a noncardiac etiology we will defer this option.     Regarding his hypertension, I have started him on losartan 25 mg daily.  I have ordered a BMP to be obtained 2 weeks from today.

## 2024-04-19 NOTE — PROGRESS NOTES
Care Management Discharge Note    Discharge Date: 04/19/2024       Discharge Disposition: Home    Discharge Services: None    Discharge DME: None    Discharge Transportation: family or friend will provide    Private pay costs discussed: Not applicable    Does the patient's insurance plan have a 3 day qualifying hospital stay waiver?  No    PAS Confirmation Code:  N/A  Patient/family educated on Medicare website which has current facility and service quality ratings: no    Education Provided on the Discharge Plan: Yes  Persons Notified of Discharge Plans: patient, spouse  Patient/Family in Agreement with the Plan: yes    Handoff Referral Completed: No    Additional Information:  Patient to discharge home with spouse with no care management needs.     JULIANA Bazan, LGSW   Social Work   [facility]

## 2024-04-22 ENCOUNTER — TELEPHONE (OUTPATIENT)
Dept: CARDIOLOGY | Facility: CLINIC | Age: 89
End: 2024-04-22
Payer: COMMERCIAL

## 2024-04-22 ENCOUNTER — ORDERS ONLY (AUTO-RELEASED) (OUTPATIENT)
Dept: CARDIOLOGY | Facility: CLINIC | Age: 89
End: 2024-04-22
Payer: COMMERCIAL

## 2024-04-22 DIAGNOSIS — R06.09 EXERTIONAL DYSPNEA: ICD-10-CM

## 2024-04-22 DIAGNOSIS — R94.31 NONSPECIFIC ABNORMAL ELECTROCARDIOGRAM (ECG) (EKG): ICD-10-CM

## 2024-04-22 NOTE — TELEPHONE ENCOUNTER
Patient was admitted to [facility] on 4/17/24 with progressive dyspnea exertion was noted to have mild troponin elevation.     PMH: HTN.    4/17/24: Echo showed EF of 52% with borderline concentric LVH. Septal motion is consistent with conduction abnormality. Regional wall motion abnormalities cannot be excluded due to limited visualization (lateral and inferolateral shadowing). No significant valvular heart disease. 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    4/18/24: Coronary angiogram via RFA showed:         Prox LAD to Mid LAD lesion is 50% stenosed.    1st Diag lesion is 50% stenosed.    Left ventricular filling pressures are normal.     1.  No obstructive CAD on coronary angiography with serial mild to moderate lesions in the proximal LAD that was found to be hemodynamically       insignificant by IFR (IFR 0.94).  2.  Normal left-sided filling pressures (LVEDP 15 mmHg).    Pt was started on ASA, Lipitor, Metoprolol. PTA Cozaar dosage was increased at time of discharge.    Pt is scheduled for a cMRI on 5/30/24 at 0815 in [place], and an OV on 6/5/24 at 1510 with JEN Malathi Lipscomb at our [place] [facility]. 7 day Zio patch monitor is being mailed out.     Writer attempted to call pt for a cardiology post discharge phone call, but no answer. VM left to return my call. RACHNA Hahn RN.

## 2024-04-22 NOTE — TELEPHONE ENCOUNTER
JEN visit is scheduled in [place] on 6/5/2024.    Order placed for 7-day ziopatch. Verified address with patient. He is comfortable putting it on at home.    Order placed for stress MRI. Patient verified he is not claustrophobic and does not need sedation. No know allergies.  Message sent to scheduling team

## 2024-04-24 NOTE — TELEPHONE ENCOUNTER
Second attempt at trying to call pt.    Called patient to discuss any post hospital d/c questions he may have, review medication changes, and confirm f/u appts. Patient denied any questions regarding new medications or changes with their PTA medications.    Patient denied any increased SOB, any chest pain or lightheadedness.     RFA cardiac cath site is without bleeding, swelling, redness or signs of infection.     RN confirmed with patient that he is scheduled for a cMRI on 5/30/24 at 0815 in [place], and an OV on 6/5/24 at 1510 with RADAMES Malathi Lipscomb at our [place] [facility]. 7 day Zio patch monitor is being mailed out.     Patient advised to call clinic with any cardiac related questions or concerns prior to this radames't. Patient verbalized understanding and agreed with plan. RACHNA Hahn RN.

## 2024-05-08 ENCOUNTER — TELEPHONE (OUTPATIENT)
Dept: CARDIOLOGY | Facility: CLINIC | Age: 89
End: 2024-05-08
Payer: COMMERCIAL

## 2024-05-08 DIAGNOSIS — R06.09 EXERTIONAL DYSPNEA: Primary | ICD-10-CM

## 2024-05-08 PROCEDURE — 93244 EXT ECG>48HR<7D REV&INTERPJ: CPT | Performed by: INTERNAL MEDICINE

## 2024-05-08 NOTE — TELEPHONE ENCOUNTER
Krista report 5/8/2024 noted. Ordered for work up of dyspnea.    Stress MRI scheduled 5/30/2024  Patient to see JEN Malathi Lipscomb @ [place] on 6/5/2024    Angiogram:     Prox LAD to Mid LAD lesion is 50% stenosed.    1st Diag lesion is 50% stenosed.    Echo: borderline concentric LVH    Ziopatch:  Per Dr. Infante: Symptoms reported were mostly related to frequent PACs (10%) and occasional PVCs (1.1%)   HR average 67 BPM  1 7-beat run NSVT  383 runs SVT up to 23 seconds    Will message Dr. Ashraf to review

## 2024-05-09 ENCOUNTER — MYC MEDICAL ADVICE (OUTPATIENT)
Dept: CARDIOLOGY | Facility: CLINIC | Age: 89
End: 2024-05-09
Payer: COMMERCIAL

## 2024-05-09 DIAGNOSIS — R06.09 EXERTIONAL DYSPNEA: Primary | ICD-10-CM

## 2024-05-09 DIAGNOSIS — R06.02 SOB (SHORTNESS OF BREATH): ICD-10-CM

## 2024-05-10 ENCOUNTER — MYC MEDICAL ADVICE (OUTPATIENT)
Dept: PULMONOLOGY | Facility: CLINIC | Age: 89
End: 2024-05-10
Payer: COMMERCIAL

## 2024-05-30 ENCOUNTER — HOSPITAL ENCOUNTER (OUTPATIENT)
Dept: MRI IMAGING | Facility: HOSPITAL | Age: 89
Discharge: HOME | End: 2024-05-30
Attending: INTERNAL MEDICINE
Payer: COMMERCIAL

## 2024-05-30 VITALS
HEART RATE: 64 BPM | WEIGHT: 169 LBS | DIASTOLIC BLOOD PRESSURE: 63 MMHG | OXYGEN SATURATION: 96 % | HEIGHT: 66 IN | BODY MASS INDEX: 27.16 KG/M2 | SYSTOLIC BLOOD PRESSURE: 131 MMHG

## 2024-05-30 DIAGNOSIS — I50.30 HEART FAILURE WITH PRESERVED EJECTION FRACTION, NYHA CLASS I (H): ICD-10-CM

## 2024-05-30 DIAGNOSIS — I25.9 CHEST PAIN DUE TO MYOCARDIAL ISCHEMIA, UNSPECIFIED ISCHEMIC CHEST PAIN TYPE: ICD-10-CM

## 2024-05-30 DIAGNOSIS — R94.31 NONSPECIFIC ABNORMAL ELECTROCARDIOGRAM (ECG) (EKG): ICD-10-CM

## 2024-05-30 DIAGNOSIS — R06.09 EXERTIONAL DYSPNEA: ICD-10-CM

## 2024-05-30 LAB
ATRIAL RATE - MUSE: 68 BPM
ATRIAL RATE - MUSE: 74 BPM
DIASTOLIC BLOOD PRESSURE - MUSE: NORMAL MMHG
DIASTOLIC BLOOD PRESSURE - MUSE: NORMAL MMHG
INTERPRETATION ECG - MUSE: NORMAL
INTERPRETATION ECG - MUSE: NORMAL
P AXIS - MUSE: 57 DEGREES
P AXIS - MUSE: 75 DEGREES
PR INTERVAL - MUSE: 164 MS
PR INTERVAL - MUSE: 164 MS
QRS DURATION - MUSE: 94 MS
QRS DURATION - MUSE: 96 MS
QT - MUSE: 364 MS
QT - MUSE: 410 MS
QTC - MUSE: 404 MS
QTC - MUSE: 435 MS
R AXIS - MUSE: 19 DEGREES
R AXIS - MUSE: 36 DEGREES
SYSTOLIC BLOOD PRESSURE - MUSE: NORMAL MMHG
SYSTOLIC BLOOD PRESSURE - MUSE: NORMAL MMHG
T AXIS - MUSE: -79 DEGREES
T AXIS - MUSE: 250 DEGREES
VENTRICULAR RATE- MUSE: 68 BPM
VENTRICULAR RATE- MUSE: 74 BPM

## 2024-05-30 PROCEDURE — 255N000002 HC RX 255 OP 636: Performed by: INTERNAL MEDICINE

## 2024-05-30 PROCEDURE — 75563 CARD MRI W/STRESS IMG & DYE: CPT | Mod: 26 | Performed by: INTERNAL MEDICINE

## 2024-05-30 PROCEDURE — A9585 GADOBUTROL INJECTION: HCPCS | Performed by: INTERNAL MEDICINE

## 2024-05-30 PROCEDURE — 93010 ELECTROCARDIOGRAM REPORT: CPT | Performed by: INTERNAL MEDICINE

## 2024-05-30 PROCEDURE — 93018 CV STRESS TEST I&R ONLY: CPT | Performed by: INTERNAL MEDICINE

## 2024-05-30 PROCEDURE — 250N000011 HC RX IP 250 OP 636: Performed by: INTERNAL MEDICINE

## 2024-05-30 PROCEDURE — 93005 ELECTROCARDIOGRAM TRACING: CPT

## 2024-05-30 PROCEDURE — 75563 CARD MRI W/STRESS IMG & DYE: CPT

## 2024-05-30 PROCEDURE — 999N000122 MR MYOCARDIUM  OVERREAD

## 2024-05-30 PROCEDURE — 93016 CV STRESS TEST SUPVJ ONLY: CPT | Performed by: INTERNAL MEDICINE

## 2024-05-30 RX ORDER — GADOBUTROL 604.72 MG/ML
16 INJECTION INTRAVENOUS ONCE
Status: COMPLETED | OUTPATIENT
Start: 2024-05-30 | End: 2024-05-30

## 2024-05-30 RX ORDER — REGADENOSON 0.08 MG/ML
0.4 INJECTION, SOLUTION INTRAVENOUS ONCE
Status: COMPLETED | OUTPATIENT
Start: 2024-05-30 | End: 2024-05-30

## 2024-05-30 RX ORDER — AMINOPHYLLINE 25 MG/ML
50 INJECTION, SOLUTION INTRAVENOUS
Status: DISCONTINUED | OUTPATIENT
Start: 2024-05-30 | End: 2024-05-30

## 2024-05-30 RX ADMIN — GADOBUTROL 16 ML: 604.72 INJECTION INTRAVENOUS at 08:44

## 2024-05-30 RX ADMIN — REGADENOSON 0.4 MG: 0.08 INJECTION, SOLUTION INTRAVENOUS at 08:46

## 2024-06-05 ENCOUNTER — OFFICE VISIT (OUTPATIENT)
Dept: CARDIOLOGY | Facility: CLINIC | Age: 89
End: 2024-06-05
Attending: NURSE PRACTITIONER
Payer: COMMERCIAL

## 2024-06-05 VITALS
WEIGHT: 171 LBS | BODY MASS INDEX: 27.48 KG/M2 | HEIGHT: 66 IN | HEART RATE: 85 BPM | OXYGEN SATURATION: 98 % | DIASTOLIC BLOOD PRESSURE: 103 MMHG | SYSTOLIC BLOOD PRESSURE: 175 MMHG

## 2024-06-05 DIAGNOSIS — I25.10 NONOBSTRUCTIVE ATHEROSCLEROSIS OF CORONARY ARTERY: Primary | ICD-10-CM

## 2024-06-05 DIAGNOSIS — Z86.79 HISTORY OF CARDIOMYOPATHY: ICD-10-CM

## 2024-06-05 DIAGNOSIS — I10 BENIGN ESSENTIAL HYPERTENSION: ICD-10-CM

## 2024-06-05 DIAGNOSIS — E78.5 HYPERLIPIDEMIA LDL GOAL <70: ICD-10-CM

## 2024-06-05 PROCEDURE — 99214 OFFICE O/P EST MOD 30 MIN: CPT | Performed by: NURSE PRACTITIONER

## 2024-06-05 RX ORDER — ATORVASTATIN CALCIUM 40 MG/1
40 TABLET, FILM COATED ORAL DAILY
Qty: 30 TABLET | Refills: 11 | Status: SHIPPED | OUTPATIENT
Start: 2024-06-05

## 2024-06-05 RX ORDER — LOSARTAN POTASSIUM 50 MG/1
50 TABLET ORAL DAILY
Qty: 30 TABLET | Refills: 11 | Status: SHIPPED | OUTPATIENT
Start: 2024-06-05 | End: 2024-08-01

## 2024-06-05 RX ORDER — ASPIRIN 81 MG/1
81 TABLET, CHEWABLE ORAL DAILY
COMMUNITY
Start: 2024-06-05

## 2024-06-05 RX ORDER — METOPROLOL SUCCINATE 25 MG/1
25 TABLET, EXTENDED RELEASE ORAL DAILY
Qty: 30 TABLET | Refills: 11 | Status: SHIPPED | OUTPATIENT
Start: 2024-06-05

## 2024-06-05 NOTE — LETTER
6/5/2024    Physician No Ref-Primary  No address on file    RE: Tom Summers       Dear Colleague,     I had the pleasure of seeing Tom Summers in the [facility].  Cardiology Clinic Progress Note  Tom Summers MRN# [id number]   YOB: 1935 Age: 89 year old      Primary Cardiologist:   Dr. Ashraf  Patient presents today for testing follow-up        History of Presenting Illness:      Tom Summers is a pleasant 89 year old patient with a past cardiac history significant for   Nonobstructive CAD   Moderate disease on angio 4/2024 50% proximal to mid LAD, 50% D1 with negative IFR  Nonischemic cardiomyopathy  EF 50 to 55% on echo 4/2024  LVEF 68%, RVEF 52% on CMR 5/2024  Etiology: Likely hypertensive  Hypertension  Hyperlipidemia  He is a retired .      Patient was seen by Dr. Ashraf in consultation in April 2024 for progressively worsening dyspnea on exertion and fatigue.  Echocardiogram June 2023 showed normal biventricular function with mild to moderate LVH and ELANA.  [place] scan 2019 was negative for ischemia (reporting dyspnea on exertion and atypical chest discomfort at that time).  He was needing to stop and rest while walking to the mailbox and climbing stairs.  Blood pressures were elevated and he was started on losartan.  Further testing recommended. BNP was WNL and negative chest x-ray.  Echo 4/17/2024 showing LVEF 52%, wall motion not well-visualized and no significant valvular disease.  D-dimer was elevated and he was sent to the ED.     Patient was hospitalized on 4/17/2024.  He had negative CT PE.  He had mild flat troponin elevation.  He was found to have nonobstructive CAD on angiogram.  He was started on metoprolol and atorvastatin and losartan was increased.  Dyspnea exertion felt to be related to uncontrolled hypertension.  Discharge summary reviewed today.     Most recent blood profile, BMP, ALT reviewed today. Stress cardiac MRI May 2024 was negative  for ischemia, LVEF 68%, no MI, scar, infiltrative disease, RVEF 52%, ELANA.  Zio patch showed sinus rhythm with frequent PACs 10% and occasional PVCs.  Results reviewed today.  This was reviewed by Dr. Ashraf who recommended pulmonology referral for LERMA.    Side effect medication changes? None.  No longer taking new meds, as he finished the 30 days and never refilled.   BPs? 140-150 at home without new meds   Weight? Stable   Heart failure symptoms? None   LERMA? Improved but not back to baseline       Patient reports no chest pain, PND, orthopnea, presyncope, syncope, edema, heart racing, or palpitations.                  Assessment and Plan:       Plan  Patient Instructions   Medication Changes:  Restart losartan, metoprolol, and atorvastatin   start aspirin 81 mg daily    Recommendations:  Check blood pressure at least 1 hour after medications. Call the clinic if your blood pressure is consistently greater than 130/80.   Check daily weights and call the clinic if your weight has increased more than 2 lbs in one day or 5 lbs in one week; if you feel more short of breath or have worsening swelling in your legs or abdomen.  Call if blood pressure is less than 90 on top or less than 100 with lightheadedness.       Follow-up:  Fasting lab in 2 months (lipid/ALT)  Cardiology follow up at [facility]: jesús in 1-2 months    Cardiology Scheduling~[phone]  Cardiology Clinic RN~[phone] (Claire RN, Mayelin RN; Melva RN)          Nonobstructive atherosclerosis of coronary artery  No angina  Continue GDMT    History of cardiomyopathy  EF normalized  Continue ARB, beta-blocker    Benign essential hypertension  Not well Controlled  Continue current medications              Respiratory:  clear to auscultation; normal symmetry        Cardiac: regular rate and rhythm     GI:  abdomen nondistended     Extremities and Muscular Skeletal:   no edema            Thank you for allowing me to participate in this delightful  patient's care.   This note was completed in part using Dragon voice recognition software. Although reviewed after completion, some word and grammatical errors may occur.    FARHAD Page CNP        Thank you for allowing me to participate in the care of your patient.      Sincerely,     FARHAD Page CNP     [facility]  cc:   Palma Klein, CNP  [address] [place] [address]

## 2024-06-05 NOTE — PROGRESS NOTES
Cardiology Clinic Progress Note  Tom Summers MRN# [id number]   YOB: 1935 Age: 89 year old      Primary Cardiologist:   Dr. Ashraf  Patient presents today for testing follow-up        History of Presenting Illness:      Tom Summers is a pleasant 89 year old patient with a past cardiac history significant for   Nonobstructive CAD   Moderate disease on angio 4/2024 50% proximal to mid LAD, 50% D1 with negative IFR  Nonischemic cardiomyopathy  EF 50 to 55% on echo 4/2024  LVEF 68%, RVEF 52% on CMR 5/2024  Etiology: Likely hypertensive  Hypertension  Hyperlipidemia  He is a retired .      Patient was seen by Dr. Ashraf in consultation in April 2024 for progressively worsening dyspnea on exertion and fatigue.  Echocardiogram June 2023 showed normal biventricular function with mild to moderate LVH and ELANA.  Flores scan 2019 was negative for ischemia (reporting dyspnea on exertion and atypical chest discomfort at that time).  He was needing to stop and rest while walking to the mailbox and climbing stairs.  Blood pressures were elevated and he was started on losartan.  Further testing recommended. BNP was WNL and negative chest x-ray.  Echo 4/17/2024 showing LVEF 52%, wall motion not well-visualized and no significant valvular disease.  D-dimer was elevated and he was sent to the ED.     Patient was hospitalized on 4/17/2024.  He had negative CT PE.  He had mild flat troponin elevation.  He was found to have nonobstructive CAD on angiogram.  He was started on metoprolol and atorvastatin and losartan was increased.  Dyspnea exertion felt to be related to uncontrolled hypertension.  Discharge summary reviewed today.     Most recent blood profile, BMP, ALT reviewed today. Stress cardiac MRI May 2024 was negative for ischemia, LVEF 68%, no MI, scar, infiltrative disease, RVEF 52%, ELANA.  Zio patch showed sinus rhythm with frequent PACs 10% and occasional PVCs.  Results reviewed today.  This was reviewed  by Dr. Ashraf who recommended pulmonology referral for LERMA.    Side effect medication changes? None.  No longer taking new meds, as he finished the 30 days and never refilled.   BPs? 140-150 at home without new meds   Weight? Stable   Heart failure symptoms? None   LERMA? Improved but not back to baseline       Patient reports no chest pain, PND, orthopnea, presyncope, syncope, edema, heart racing, or palpitations.                  Assessment and Plan:       Plan  Patient Instructions   Medication Changes:  Restart losartan, metoprolol, and atorvastatin   start aspirin 81 mg daily    Recommendations:  Check blood pressure at least 1 hour after medications. Call the clinic if your blood pressure is consistently greater than 130/80.   Check daily weights and call the clinic if your weight has increased more than 2 lbs in one day or 5 lbs in one week; if you feel more short of breath or have worsening swelling in your legs or abdomen.  Call if blood pressure is less than 90 on top or less than 100 with lightheadedness.       Follow-up:  Fasting lab in 2 months (lipid/ALT)  Cardiology follow up at [facility]: malathi in 1-2 months    Cardiology Scheduling~[phone]  Cardiology Clinic RN~[phone] (Claire RN, Mayelin RN; Melva RN)          Nonobstructive atherosclerosis of coronary artery  No angina  Continue GDMT    History of cardiomyopathy  EF normalized  Continue ARB, beta-blocker    Benign essential hypertension  Not well Controlled  Continue current medications              Respiratory:  clear to auscultation; normal symmetry        Cardiac: regular rate and rhythm     GI:  abdomen nondistended     Extremities and Muscular Skeletal:   no edema            Thank you for allowing me to participate in this delightful patient's care.   This note was completed in part using Dragon voice recognition software. Although reviewed after completion, some word and grammatical errors may occur.    Malathi CHAVIS  FARHAD Grullon CNP

## 2024-06-05 NOTE — NURSING NOTE
No chief complaint on file.      There were no vitals filed for this visit.  Wt Readings from Last 1 Encounters:   05/30/24 76.7 kg (169 lb)     Porsha Lake MA

## 2024-06-27 ENCOUNTER — OFFICE VISIT (OUTPATIENT)
Dept: PULMONOLOGY | Facility: CLINIC | Age: 89
End: 2024-06-27
Payer: COMMERCIAL

## 2024-06-27 VITALS
BODY MASS INDEX: 27.16 KG/M2 | WEIGHT: 169 LBS | HEIGHT: 66 IN | DIASTOLIC BLOOD PRESSURE: 86 MMHG | HEART RATE: 134 BPM | OXYGEN SATURATION: 98 % | SYSTOLIC BLOOD PRESSURE: 138 MMHG

## 2024-06-27 DIAGNOSIS — R06.02 SOB (SHORTNESS OF BREATH): ICD-10-CM

## 2024-06-27 DIAGNOSIS — R06.09 EXERTIONAL DYSPNEA: ICD-10-CM

## 2024-06-27 DIAGNOSIS — R00.0 TACHYCARDIA: ICD-10-CM

## 2024-06-27 DIAGNOSIS — R06.09 EXERTIONAL DYSPNEA: Primary | ICD-10-CM

## 2024-06-27 DIAGNOSIS — D64.9 ANEMIA, UNSPECIFIED TYPE: ICD-10-CM

## 2024-06-27 DIAGNOSIS — R91.8 PULMONARY NODULES: ICD-10-CM

## 2024-06-27 LAB — HGB BLD-MCNC: 11.6 G/DL

## 2024-06-27 PROCEDURE — 99214 OFFICE O/P EST MOD 30 MIN: CPT | Performed by: NURSE PRACTITIONER

## 2024-06-27 PROCEDURE — 94726 PLETHYSMOGRAPHY LUNG VOLUMES: CPT | Mod: 26 | Performed by: INTERNAL MEDICINE

## 2024-06-27 PROCEDURE — 94729 DIFFUSING CAPACITY: CPT | Mod: 26 | Performed by: INTERNAL MEDICINE

## 2024-06-27 PROCEDURE — 85018 HEMOGLOBIN: CPT | Mod: QW | Performed by: INTERNAL MEDICINE

## 2024-06-27 PROCEDURE — 94060 EVALUATION OF WHEEZING: CPT | Mod: 26 | Performed by: INTERNAL MEDICINE

## 2024-06-27 NOTE — PATIENT INSTRUCTIONS
It was a pleasure to see you in clinic today.   Here is what we discussed:    Your pulmonary function testing was normal.   Your shortness of breath is most likely a combination of your cardiac disease, age, deconditioning and possibly low hemoglobin.  The nodule in your right lung does not need any further imaging.  Talk to your primary doctor about the hemoglobin.  I have referred you to pulmonary rehab.  If you cannot do this, increase your activity on your own.   Call my nurse, Sina ([phone]) with any change or worsening of your breathing.  Follow-up as needed.     Sonal Lima, CNP  Pulmonary Medicine  [facility]  [phone]

## 2024-06-27 NOTE — PROGRESS NOTES
Patient oxygen saturation on RA at rest is 98% pulse 134  Oxygen saturation after ambulating 400ft on RA is 95% pulse 130        Patient is ambulatory within his/her home.      Nazia Valverde LPN
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
## 2024-07-02 LAB
DLCOCOR-%PRED-PRE: 78 %
DLCOCOR-PRE: 16.28 ML/MIN/MMHG
DLCOUNC-%PRED-PRE: 70 %
DLCOUNC-PRE: 14.72 ML/MIN/MMHG
DLCOUNC-PRED: 20.77 ML/MIN/MMHG
ERV-%PRED-PRE: 67 %
ERV-PRE: 0.68 L
ERV-PRED: 1.01 L
EXPTIME-PRE: 6.43 SEC
FEF2575-%PRED-POST: 162 %
FEF2575-%PRED-PRE: 133 %
FEF2575-POST: 2.49 L/SEC
FEF2575-PRE: 2.06 L/SEC
FEF2575-PRED: 1.53 L/SEC
FEFMAX-%PRED-PRE: 126 %
FEFMAX-PRE: 6.76 L/SEC
FEFMAX-PRED: 5.36 L/SEC
FEV1-%PRED-PRE: 120 %
FEV1-PRE: 2.62 L
FEV1FEV6-PRE: 75 %
FEV1FEV6-PRED: 75 %
FEV1FVC-PRE: 75 %
FEV1FVC-PRED: 75 %
FEV1SVC-PRE: 71 %
FEV1SVC-PRED: 66 %
FIFMAX-PRE: 4.4 L/SEC
FRCPLETH-%PRED-PRE: 103 %
FRCPLETH-PRE: 3.75 L
FRCPLETH-PRED: 3.62 L
FVC-%PRED-PRE: 119 %
FVC-PRE: 3.49 L
FVC-PRED: 2.93 L
IC-%PRED-PRE: 149 %
IC-PRE: 3.01 L
IC-PRED: 2.01 L
RVPLETH-%PRED-PRE: 106 %
RVPLETH-PRE: 3.07 L
RVPLETH-PRED: 2.89 L
TLCPLETH-%PRED-PRE: 107 %
TLCPLETH-PRE: 6.76 L
TLCPLETH-PRED: 6.31 L
VA-%PRED-PRE: 100 %
VA-PRE: 5.42 L
VC-%PRED-PRE: 112 %
VC-PRE: 3.7 L
VC-PRED: 3.29 L

## 2024-08-01 ENCOUNTER — OFFICE VISIT (OUTPATIENT)
Dept: CARDIOLOGY | Facility: CLINIC | Age: 89
End: 2024-08-01
Attending: NURSE PRACTITIONER
Payer: COMMERCIAL

## 2024-08-01 ENCOUNTER — LAB (OUTPATIENT)
Dept: LAB | Facility: CLINIC | Age: 89
End: 2024-08-01
Attending: NURSE PRACTITIONER
Payer: COMMERCIAL

## 2024-08-01 VITALS
WEIGHT: 173 LBS | RESPIRATION RATE: 16 BRPM | SYSTOLIC BLOOD PRESSURE: 167 MMHG | BODY MASS INDEX: 27.92 KG/M2 | OXYGEN SATURATION: 99 % | DIASTOLIC BLOOD PRESSURE: 84 MMHG | HEART RATE: 57 BPM

## 2024-08-01 DIAGNOSIS — Z86.79 HISTORY OF CARDIOMYOPATHY: ICD-10-CM

## 2024-08-01 DIAGNOSIS — E78.5 HYPERLIPIDEMIA LDL GOAL <70: ICD-10-CM

## 2024-08-01 DIAGNOSIS — I10 BENIGN ESSENTIAL HYPERTENSION: ICD-10-CM

## 2024-08-01 DIAGNOSIS — I25.10 NONOBSTRUCTIVE ATHEROSCLEROSIS OF CORONARY ARTERY: ICD-10-CM

## 2024-08-01 LAB
ALT SERPL W P-5'-P-CCNC: 19 U/L (ref 0–70)
CHOLEST SERPL-MCNC: 142 MG/DL
FASTING STATUS PATIENT QL REPORTED: NO
HDLC SERPL-MCNC: 61 MG/DL
LDLC SERPL CALC-MCNC: 41 MG/DL
NONHDLC SERPL-MCNC: 81 MG/DL
TRIGL SERPL-MCNC: 200 MG/DL

## 2024-08-01 PROCEDURE — 36415 COLL VENOUS BLD VENIPUNCTURE: CPT | Performed by: NURSE PRACTITIONER

## 2024-08-01 PROCEDURE — 80061 LIPID PANEL: CPT | Performed by: NURSE PRACTITIONER

## 2024-08-01 PROCEDURE — 99214 OFFICE O/P EST MOD 30 MIN: CPT | Performed by: NURSE PRACTITIONER

## 2024-08-01 PROCEDURE — 84460 ALANINE AMINO (ALT) (SGPT): CPT | Performed by: NURSE PRACTITIONER

## 2024-08-01 RX ORDER — LOSARTAN POTASSIUM 100 MG/1
100 TABLET ORAL DAILY
Qty: 90 TABLET | Refills: 3 | Status: SHIPPED | OUTPATIENT
Start: 2024-08-01

## 2024-08-01 NOTE — LETTER
8/1/2024    Physician No Ref-Primary  No address on file    RE: Tom Summers       Dear Colleague,     I had the pleasure of seeing Tom Summers in the [facility].  Cardiology Clinic Progress Note  Tom Summers MRN# [id number]   YOB: 1935 Age: 89 year old      Primary Cardiologist:   Dr. Ashraf for 1 month follow-up          History of Presenting Illness:      Nonobstructive CAD   Moderate disease on angio 4/2024 50% proximal to mid LAD, 50% D1 with negative IFR  Nonischemic cardiomyopathy  EF 50 to 55% on echo 4/2024  LVEF 68%, RVEF 52% on CMR 5/2024  Etiology: Likely hypertensive  Hypertension  Hyperlipidemia  He is a retired .      Patient was seen by Dr. Ashraf in consultation in April 2024 for progressively worsening dyspnea on exertion and fatigue. Echocardiogram June 2023 showed normal biventricular function with mild to moderate LVH and ELANA. Flores scan 2019 was negative for ischemia (reporting dyspnea on exertion and atypical chest discomfort at that time). He was needing to stop and rest while walking to the mailbox and climbing stairs. Blood pressures were elevated and he was started on losartan. Further testing recommended. BNP was WNL and negative chest x-ray. Echo 4/17/2024 showing LVEF 52%, wall motion not well-visualized and no significant valvular disease.  Nonobstructive CAD on coronary angiogram.  Dyspnea on exertion thought to be related to uncontrolled hypertension. Stress cardiac MRI May 2024 was negative for ischemia, LVEF 68%, no MI, scar, infiltrative disease, RVEF 52%, ELANA. Zio patch showed sinus rhythm with frequent PACs 10% and occasional PVCs.  Referral to pulmonology was recommended.    Patient was seen by me in June 2024.  He had misunderstood and when his medications prescribed by the hospital ran out he did not get these refilled so was not taking cardiac medications.  Blood pressures were elevated.  He did not have any heart failure  symptoms and weights were stable.  His dyspnea on exertion had reportedly improved back to his baseline.    Patient was seen by pulmonology in June 2024.  Chest imaging and PFTs were normal and he was referred to pulmonary rehab.  They felt the shortness of breath was multifactorial involving age, deconditioning, hemoglobin, cardiac disease.  Note reviewed today.      Most recent lipid profile, BMP, ALT reviewed today.    Restarted medications? Yes   Weight? Stable   Heart failure symptoms? None   BP? Elevated   Angina? None   LERMA  stable     Patient reports no chest pain, PND, orthopnea, presyncope, syncope, edema, heart racing, or palpitations.                    Assessment and Plan:     Plan  Patient Instructions   Medication Changes:  Increase losartan to 100 mg daily for HTN    Recommendations:  Check daily weights and call the clinic if your weight has increased more than 2 lbs in one day or 5 lbs in one week; if you feel more short of breath or have worsening swelling in your legs or abdomen.   Check blood pressure at least 1 hour after medications. Call the clinic if your blood pressure is consistently greater than 130/80.    Call if blood pressure is less than 90 on top or less than 100 with lightheadedness.       Follow-up:   Nonfasting lab in 1-2 weeks (BMP)  Cardiology follow up at [facility]: jesús in 2 months to reassess HTN    Cardiology Scheduling~[phone]  Cardiology Clinic RN~[phone] (Claire RN, Mayelin RN; Melva RN)          Problem List as of 8/1/2024            Noted       Active Problems    1. Hyperlipidemia LDL goal <70 4/19/2024     Relevant Orders     Follow-Up with Cardiology    2. Nonobstructive atherosclerosis of coronary artery 8/1/2024     Relevant Orders     Follow-Up with Cardiology    3. History of cardiomyopathy 8/1/2024     Relevant Orders     Follow-Up with Cardiology    4. Benign essential hypertension 8/1/2024     Relevant Medications     losartan (COZAAR) 100 MG  tablet     Other Relevant Orders     Basic metabolic panel     Follow-Up with Cardiology      Nonobstructive atherosclerosis of coronary artery  No angina  Continue GDMT     History of cardiomyopathy  EF normalized  Continue ARB, beta-blocker     Benign essential hypertension  Not well Controlled  Continue current medications     Hyperlipidemia  LDL at goal  Continue statin    LERMA   Neg pulmonary work-up  Stable   No HF symptoms   Work on routine exercise       Respiratory:  clear to auscultation; normal symmetry        Cardiac: regular rate and rhythm     GI:  abdomen nondistended     Extremities and Muscular Skeletal:   no edema                Thank you for allowing me to participate in this delightful patient's care.   This note was completed in part using Dragon voice recognition software. Although reviewed after completion, some word and grammatical errors may occur.    FARHAD Page CNP                Thank you for allowing me to participate in the care of your patient.      Sincerely,     FARHAD Page CNP     [facility]  cc:   FARHAD Ponce CNP  [address] [place] [address]

## 2024-08-01 NOTE — PATIENT INSTRUCTIONS
Medication Changes:  Increase losartan to 100 mg daily     Recommendations:  Check daily weights and call the clinic if your weight has increased more than 2 lbs in one day or 5 lbs in one week; if you feel more short of breath or have worsening swelling in your legs or abdomen.   Check blood pressure at least 1 hour after medications. Call the clinic if your blood pressure is consistently greater than 130/80.    Call if blood pressure is less than 90 on top or less than 100 with lightheadedness.       Follow-up:   Nonfasting lab in 1-2 weeks ([facility])  Cardiology follow up at [facility]: jesús in 2 months.     Cardiology Scheduling~[phone]  Cardiology Clinic RN~[phone] (Claire RN, Mayelin RN; Melva RN)

## 2024-08-09 ENCOUNTER — LAB (OUTPATIENT)
Dept: LAB | Facility: CLINIC | Age: 89
End: 2024-08-09
Payer: COMMERCIAL

## 2024-08-09 DIAGNOSIS — I10 BENIGN ESSENTIAL HYPERTENSION: ICD-10-CM

## 2024-08-09 DIAGNOSIS — I10 BENIGN ESSENTIAL HYPERTENSION: Primary | ICD-10-CM

## 2024-08-09 LAB
ANION GAP SERPL CALCULATED.3IONS-SCNC: 10 MMOL/L (ref 7–15)
BUN SERPL-MCNC: 17 MG/DL (ref 8–23)
CALCIUM SERPL-MCNC: 9.4 MG/DL (ref 8.8–10.4)
CHLORIDE SERPL-SCNC: 105 MMOL/L (ref 98–107)
CREAT SERPL-MCNC: 1.26 MG/DL (ref 0.67–1.17)
EGFRCR SERPLBLD CKD-EPI 2021: 55 ML/MIN/1.73M2
GLUCOSE SERPL-MCNC: 101 MG/DL (ref 70–99)
HCO3 SERPL-SCNC: 27 MMOL/L (ref 22–29)
POTASSIUM SERPL-SCNC: 4.5 MMOL/L (ref 3.4–5.3)
SODIUM SERPL-SCNC: 142 MMOL/L (ref 135–145)

## 2024-08-09 PROCEDURE — 36415 COLL VENOUS BLD VENIPUNCTURE: CPT | Performed by: NURSE PRACTITIONER

## 2024-08-09 PROCEDURE — 80048 BASIC METABOLIC PNL TOTAL CA: CPT | Performed by: NURSE PRACTITIONER

## 2024-08-09 NOTE — RESULT ENCOUNTER NOTE
LM for patient to call back. Will also send [employer] message as he does communicate that way as well. BMP order placed.

## 2024-08-10 ENCOUNTER — HEALTH MAINTENANCE LETTER (OUTPATIENT)
Age: 89
End: 2024-08-10

## 2024-09-17 DIAGNOSIS — I10 BENIGN ESSENTIAL HYPERTENSION: Primary | ICD-10-CM

## 2024-10-15 ENCOUNTER — LAB (OUTPATIENT)
Dept: LAB | Facility: CLINIC | Age: 89
End: 2024-10-15
Payer: COMMERCIAL

## 2024-10-15 DIAGNOSIS — I10 BENIGN ESSENTIAL HYPERTENSION: ICD-10-CM

## 2024-10-15 LAB
ANION GAP SERPL CALCULATED.3IONS-SCNC: 10 MMOL/L (ref 7–15)
BUN SERPL-MCNC: 24.2 MG/DL (ref 8–23)
CALCIUM SERPL-MCNC: 9.5 MG/DL (ref 8.8–10.4)
CHLORIDE SERPL-SCNC: 105 MMOL/L (ref 98–107)
CREAT SERPL-MCNC: 1.27 MG/DL (ref 0.67–1.17)
EGFRCR SERPLBLD CKD-EPI 2021: 55 ML/MIN/1.73M2
GLUCOSE SERPL-MCNC: 100 MG/DL (ref 70–99)
HCO3 SERPL-SCNC: 26 MMOL/L (ref 22–29)
POTASSIUM SERPL-SCNC: 4.8 MMOL/L (ref 3.4–5.3)
SODIUM SERPL-SCNC: 141 MMOL/L (ref 135–145)

## 2024-10-15 PROCEDURE — 36415 COLL VENOUS BLD VENIPUNCTURE: CPT

## 2024-10-15 PROCEDURE — 80048 BASIC METABOLIC PNL TOTAL CA: CPT

## 2024-10-19 ENCOUNTER — HOSPITAL ENCOUNTER (INPATIENT)
Facility: CLINIC | Age: 89
LOS: 3 days | Discharge: HOME | DRG: 274 | End: 2024-10-22
Attending: HOSPITALIST | Admitting: STUDENT IN AN ORGANIZED HEALTH CARE EDUCATION/TRAINING PROGRAM
Payer: COMMERCIAL

## 2024-10-19 DIAGNOSIS — I48.3 TYPICAL ATRIAL FLUTTER (H): Primary | ICD-10-CM

## 2024-10-19 DIAGNOSIS — I48.19 PERSISTENT ATRIAL FIBRILLATION (H): ICD-10-CM

## 2024-10-19 PROBLEM — I48.91 ATRIAL FIBRILLATION (H): Status: ACTIVE | Noted: 2024-10-19

## 2024-10-19 LAB
ALBUMIN UR-MCNC: NEGATIVE MG/DL
APPEARANCE UR: CLEAR
BILIRUB UR QL STRIP: NEGATIVE
COLOR UR AUTO: ABNORMAL
ERYTHROCYTE [DISTWIDTH] IN BLOOD BY AUTOMATED COUNT: 14.6 % (ref 10–15)
GLUCOSE UR STRIP-MCNC: NEGATIVE MG/DL
HCT VFR BLD AUTO: 43 % (ref 40–53)
HGB BLD-MCNC: 13.8 G/DL (ref 13.3–17.7)
HGB UR QL STRIP: NEGATIVE
HOLD SPECIMEN: NORMAL
KETONES UR STRIP-MCNC: NEGATIVE MG/DL
LEUKOCYTE ESTERASE UR QL STRIP: NEGATIVE
MCH RBC QN AUTO: 29.8 PG (ref 26.5–33)
MCHC RBC AUTO-ENTMCNC: 32.1 G/DL (ref 31.5–36.5)
MCV RBC AUTO: 93 FL (ref 78–100)
MUCOUS THREADS #/AREA URNS LPF: PRESENT /LPF
NITRATE UR QL: NEGATIVE
PH UR STRIP: 5.5 [PH] (ref 5–7)
PLATELET # BLD AUTO: 201 10E3/UL (ref 150–450)
RBC # BLD AUTO: 4.63 10E6/UL (ref 4.4–5.9)
RBC URINE: 1 /HPF
SP GR UR STRIP: 1.02 (ref 1–1.03)
UROBILINOGEN UR STRIP-MCNC: NORMAL MG/DL
WBC # BLD AUTO: 7.9 10E3/UL (ref 4–11)
WBC URINE: <1 /HPF

## 2024-10-19 PROCEDURE — 36415 COLL VENOUS BLD VENIPUNCTURE: CPT | Performed by: HOSPITALIST

## 2024-10-19 PROCEDURE — 258N000003 HC RX IP 258 OP 636: Performed by: HOSPITALIST

## 2024-10-19 PROCEDURE — 210N000001 HC R&B IMCU HEART CARE

## 2024-10-19 PROCEDURE — 85027 COMPLETE CBC AUTOMATED: CPT | Performed by: HOSPITALIST

## 2024-10-19 PROCEDURE — 250N000013 HC RX MED GY IP 250 OP 250 PS 637: Performed by: HOSPITALIST

## 2024-10-19 PROCEDURE — 250N000011 HC RX IP 250 OP 636: Performed by: HOSPITALIST

## 2024-10-19 PROCEDURE — 81001 URINALYSIS AUTO W/SCOPE: CPT | Performed by: HOSPITALIST

## 2024-10-19 PROCEDURE — 99223 1ST HOSP IP/OBS HIGH 75: CPT | Performed by: HOSPITALIST

## 2024-10-19 PROCEDURE — 84484 ASSAY OF TROPONIN QUANT: CPT | Performed by: HOSPITALIST

## 2024-10-19 PROCEDURE — 250N000009 HC RX 250: Performed by: HOSPITALIST

## 2024-10-19 RX ORDER — LEVOCETIRIZINE DIHYDROCHLORIDE 5 MG/1
5 TABLET, FILM COATED ORAL DAILY
Status: DISCONTINUED | OUTPATIENT
Start: 2024-10-20 | End: 2024-10-22 | Stop reason: HOSPADM

## 2024-10-19 RX ORDER — AMOXICILLIN 250 MG
1 CAPSULE ORAL 2 TIMES DAILY PRN
Status: DISCONTINUED | OUTPATIENT
Start: 2024-10-19 | End: 2024-10-22 | Stop reason: HOSPADM

## 2024-10-19 RX ORDER — AMOXICILLIN 250 MG
2 CAPSULE ORAL 2 TIMES DAILY
Status: DISCONTINUED | OUTPATIENT
Start: 2024-10-19 | End: 2024-10-22 | Stop reason: HOSPADM

## 2024-10-19 RX ORDER — ASPIRIN 81 MG/1
81 TABLET, CHEWABLE ORAL DAILY
Status: DISCONTINUED | OUTPATIENT
Start: 2024-10-19 | End: 2024-10-20

## 2024-10-19 RX ORDER — ONDANSETRON 4 MG/1
4 TABLET, ORALLY DISINTEGRATING ORAL EVERY 6 HOURS PRN
Status: DISCONTINUED | OUTPATIENT
Start: 2024-10-19 | End: 2024-10-22 | Stop reason: HOSPADM

## 2024-10-19 RX ORDER — LIDOCAINE 40 MG/G
CREAM TOPICAL
Status: DISCONTINUED | OUTPATIENT
Start: 2024-10-19 | End: 2024-10-22 | Stop reason: HOSPADM

## 2024-10-19 RX ORDER — AMOXICILLIN 250 MG
1 CAPSULE ORAL 2 TIMES DAILY
Status: DISCONTINUED | OUTPATIENT
Start: 2024-10-19 | End: 2024-10-22 | Stop reason: HOSPADM

## 2024-10-19 RX ORDER — ONDANSETRON 2 MG/ML
4 INJECTION INTRAMUSCULAR; INTRAVENOUS EVERY 6 HOURS PRN
Status: DISCONTINUED | OUTPATIENT
Start: 2024-10-19 | End: 2024-10-22 | Stop reason: HOSPADM

## 2024-10-19 RX ORDER — CALCIUM CARBONATE 500 MG/1
1000 TABLET, CHEWABLE ORAL 4 TIMES DAILY PRN
Status: DISCONTINUED | OUTPATIENT
Start: 2024-10-19 | End: 2024-10-22 | Stop reason: HOSPADM

## 2024-10-19 RX ORDER — HEPARIN SODIUM 10000 [USP'U]/100ML
0-5000 INJECTION, SOLUTION INTRAVENOUS CONTINUOUS
Status: DISCONTINUED | OUTPATIENT
Start: 2024-10-19 | End: 2024-10-20

## 2024-10-19 RX ORDER — ATORVASTATIN CALCIUM 40 MG/1
40 TABLET, FILM COATED ORAL DAILY
Status: DISCONTINUED | OUTPATIENT
Start: 2024-10-19 | End: 2024-10-22 | Stop reason: HOSPADM

## 2024-10-19 RX ORDER — AMOXICILLIN 250 MG
2 CAPSULE ORAL 2 TIMES DAILY PRN
Status: DISCONTINUED | OUTPATIENT
Start: 2024-10-19 | End: 2024-10-22 | Stop reason: HOSPADM

## 2024-10-19 RX ORDER — LIDOCAINE 40 MG/G
CREAM TOPICAL
Status: DISCONTINUED | OUTPATIENT
Start: 2024-10-19 | End: 2024-10-22

## 2024-10-19 RX ADMIN — ATORVASTATIN CALCIUM 40 MG: 40 TABLET, FILM COATED ORAL at 19:43

## 2024-10-19 RX ADMIN — HEPARIN SODIUM 950 UNITS/HR: 10000 INJECTION, SOLUTION INTRAVENOUS at 18:35

## 2024-10-19 RX ADMIN — DILTIAZEM HYDROCHLORIDE 5 MG/HR: 5 INJECTION, SOLUTION INTRAVENOUS at 19:56

## 2024-10-19 ASSESSMENT — ACTIVITIES OF DAILY LIVING (ADL)
ADLS_ACUITY_SCORE: 22
ADLS_ACUITY_SCORE: 22
ADLS_ACUITY_SCORE: 36
ADLS_ACUITY_SCORE: 20
ADLS_ACUITY_SCORE: 22
ADLS_ACUITY_SCORE: 37

## 2024-10-19 NOTE — PLAN OF CARE
Goal Outcome Evaluation:      Plan of Care Reviewed With: patient           A+Ox4. SBA/Indep. 's Aflutter RVR. Asymptomatic. Elevated BP. Awaiting MD to see for orders. Heparin gtt at 950u/hr.  Denies pain or SOB. States he voids normally, due to void since arriving. BM 10/19 am. Unable to do full skin assessment at this time, pharm and MD in room.

## 2024-10-19 NOTE — Clinical Note
Hemodynamic equipment used: 5 lead ECG, [employer] With 3 Leads, Machine BP Cuff and pulse oximeter probe.
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
## 2024-10-19 NOTE — Clinical Note
Patient education provided.  Enbrel Counseling:  I discussed with the patient the risks of etanercept including but not limited to myelosuppression, immunosuppression, autoimmune hepatitis, demyelinating diseases, lymphoma, and infections.  The patient understands that monitoring is required including a PPD at baseline and must alert us or the primary physician if symptoms of infection or other concerning signs are noted.

## 2024-10-19 NOTE — PROGRESS NOTES
Transfer Type: [facility]  Transfer Triage Note    Date of call: 10/19/24  Time of call: 2:05 PM    Current Patient Location:  [facility]   Current Level of Care: ED    Vitals: BP:144/117 HR: 110 O2 Sats: 97 on RA  Diagnosis: A Flutter  Reason for requested transfer: Procedure can be done here and not at referring hospital  Further diagnostic work up, management, and consultation for specialized care   Isolation Needs: None    Care everywhere has been updated and reviewed: Yes  Necessary images have been sent through PACS: Yes    If patient is transferring for specialty care or specific procedure, the specialist required has participated in the transfer call and agreed with need for transfer and anticipated timeline: Yes, Provider name: Dr. Mayers specialty with: Cardiology    Transfer accepted: Yes  Stability of Patient: Patient is vitally stable, with no critical labs, and will likely remain stable throughout the transfer process  Is the patient appropriate for [facility]? No, What specific Powers needs are anticipated? [facility] Cardiology  Level of Care Needed: Med Surg  Telemetry Needed:  Cardiac Telemetry  Expected Time of Arrival for Transfer: 0-8 hours  Arrival Location:  [facility]     Recommendations for Management and Stabilization: Not needed    Additional Comments: Patient is an 89 year old man with HTN, HLD, CAD, NICM, and LERMA who presented to [facility] ED reporting 5 days of HTN and tachycardia at home. Patient denies any chest pain or SOB at rest or worsening LERMA. Patient was found to be in A flutter. HR was control with IV metoprolol 5 mg and PTA metoprolol XL 25 mg. Case was discussed between Dr. Mayers and ED MD; agreed with plan to start patient on Heparin drip and transfer to [facility] for further cardiology management.    Noy Bocanegra MD on 10/19/2024 at 2:22 PM

## 2024-10-19 NOTE — Clinical Note
Prepped: groin. Prepped with: ChloraPrep. The patient was draped. . [Takes medication as prescribed] : takes [None] : Patient does not have any barriers to medication adherence

## 2024-10-20 LAB
ANION GAP SERPL CALCULATED.3IONS-SCNC: 7 MMOL/L (ref 7–15)
BUN SERPL-MCNC: 17.9 MG/DL (ref 8–23)
CALCIUM SERPL-MCNC: 8.8 MG/DL (ref 8.8–10.4)
CHLORIDE SERPL-SCNC: 105 MMOL/L (ref 98–107)
CREAT SERPL-MCNC: 1.11 MG/DL (ref 0.67–1.17)
EGFRCR SERPLBLD CKD-EPI 2021: 64 ML/MIN/1.73M2
ERYTHROCYTE [DISTWIDTH] IN BLOOD BY AUTOMATED COUNT: 14.5 % (ref 10–15)
GLUCOSE SERPL-MCNC: 99 MG/DL (ref 70–99)
HCO3 SERPL-SCNC: 27 MMOL/L (ref 22–29)
HCT VFR BLD AUTO: 37.1 % (ref 40–53)
HGB BLD-MCNC: 12.4 G/DL (ref 13.3–17.7)
MCH RBC QN AUTO: 30.5 PG (ref 26.5–33)
MCHC RBC AUTO-ENTMCNC: 33.4 G/DL (ref 31.5–36.5)
MCV RBC AUTO: 91 FL (ref 78–100)
PLATELET # BLD AUTO: 161 10E3/UL (ref 150–450)
POTASSIUM SERPL-SCNC: 4.1 MMOL/L (ref 3.4–5.3)
RBC # BLD AUTO: 4.06 10E6/UL (ref 4.4–5.9)
SODIUM SERPL-SCNC: 139 MMOL/L (ref 135–145)
TROPONIN T SERPL HS-MCNC: 29 NG/L
TROPONIN T SERPL HS-MCNC: 31 NG/L
UFH PPP CHRO-ACNC: 0.4 IU/ML
WBC # BLD AUTO: 7.6 10E3/UL (ref 4–11)

## 2024-10-20 PROCEDURE — 250N000009 HC RX 250: Performed by: HOSPITALIST

## 2024-10-20 PROCEDURE — 85027 COMPLETE CBC AUTOMATED: CPT | Performed by: HOSPITALIST

## 2024-10-20 PROCEDURE — 99222 1ST HOSP IP/OBS MODERATE 55: CPT | Mod: 25 | Performed by: INTERNAL MEDICINE

## 2024-10-20 PROCEDURE — 36415 COLL VENOUS BLD VENIPUNCTURE: CPT | Performed by: HOSPITALIST

## 2024-10-20 PROCEDURE — 250N000013 HC RX MED GY IP 250 OP 250 PS 637: Performed by: INTERNAL MEDICINE

## 2024-10-20 PROCEDURE — 210N000001 HC R&B IMCU HEART CARE

## 2024-10-20 PROCEDURE — 99232 SBSQ HOSP IP/OBS MODERATE 35: CPT | Performed by: STUDENT IN AN ORGANIZED HEALTH CARE EDUCATION/TRAINING PROGRAM

## 2024-10-20 PROCEDURE — 258N000003 HC RX IP 258 OP 636: Performed by: HOSPITALIST

## 2024-10-20 PROCEDURE — 250N000013 HC RX MED GY IP 250 OP 250 PS 637: Performed by: HOSPITALIST

## 2024-10-20 PROCEDURE — 80048 BASIC METABOLIC PNL TOTAL CA: CPT | Performed by: HOSPITALIST

## 2024-10-20 PROCEDURE — 85520 HEPARIN ASSAY: CPT | Performed by: HOSPITALIST

## 2024-10-20 PROCEDURE — 84484 ASSAY OF TROPONIN QUANT: CPT | Performed by: HOSPITALIST

## 2024-10-20 RX ADMIN — SENNOSIDES AND DOCUSATE SODIUM 1 TABLET: 50; 8.6 TABLET ORAL at 21:43

## 2024-10-20 RX ADMIN — APIXABAN 5 MG: 5 TABLET, FILM COATED ORAL at 21:43

## 2024-10-20 RX ADMIN — DILTIAZEM HYDROCHLORIDE 5 MG/HR: 5 INJECTION, SOLUTION INTRAVENOUS at 15:17

## 2024-10-20 RX ADMIN — ATORVASTATIN CALCIUM 40 MG: 40 TABLET, FILM COATED ORAL at 08:27

## 2024-10-20 RX ADMIN — APIXABAN 5 MG: 5 TABLET, FILM COATED ORAL at 10:05

## 2024-10-20 ASSESSMENT — ACTIVITIES OF DAILY LIVING (ADL)
ADLS_ACUITY_SCORE: 22

## 2024-10-20 NOTE — PLAN OF CARE
Care plan note:      Recent Vitals:  Temp: 98.5  F (36.9  C) Temp src: Oral BP: 103/64 Pulse: 72   Resp: 16 SpO2: 99 % O2 Device: None (Room air)      Orientation/Neuro: Alert and Oriented x 4  Pain: The patient is not having any pain.   Tele: Atrial flutter   IV medications: Diltiazem   Mobility: St. by assist   Skin: With in normal limits   Resp: RA  GI: WDL  : WDL     Diet: Tolerating diet:   Well  Orders Placed This Encounter      NPO for Medical/Clinical Reasons Except for: Meds      Safety/Concerns:  Fall Risk  Aggression Color: Green    Plan: Hospitalist following. Planing cards consulted, planning PATRICE ablation tomorrow, NPO at midnight.  Heparin gtt stopped and transitioned to Eliquis. HR controlled on on dilt gtt. Following creatinine. Trops, peaked at 31. Denies CP.     Continue to monitor.      Ange Mckeon RN
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
## 2024-10-20 NOTE — PROGRESS NOTES
[facility]    Medicine Progress Note - Hospitalist Service    Date of Admission:  10/19/2024  Date of Service: 10/20/2024    Assessment & Plan   Tom Summers is a 89 year old male with a history of nonischemic cardiomyopathy, nonobstructive coronary artery disease, hypertension who presents with progressive worsening dyspnea on exertion and was found to be in new onset atrial flutter.  Patient presented to [facility] ED where he was given IV metoprolol 5 mg and Toprol-XL 25 mg with improved heart rate control.  Case was discussed between Dr. Betancur and ED and patient was started on heparin drip and transferred here for further cardiology management.    Patient was checking his blood pressure daily and noticed that his heart rates were in 140s over the last 4 to 5 days and finally decided to go into get evaluated today.  He is completely asymptomatic from this.  Denies any palpitations, lightheadedness, chest pain, shortness of breath.  He has been dealing with progressive dyspnea on exertion over the last 1 year that has not significantly changed in the last few days.    #New onset atrial flutter  Nonischemic cardiomyopathy  Hypertension  Hyperlipidemia    Patient was seen by Dr. Ashraf in April for progressively worsening dyspnea.  Echo 4/17/2024 showed EF 52% with no significant valvular disease.  Coronary angiogram on 4/18/2024 showed no significant coronary artery disease.  Had 50% proximal to mid LAD and 50% first diagonal lesion stenosis.  Cardiac MRI was negative for ischemia with EF 68%, no MI, scar or infiltrative disease, right ventricular EF 52%, ELANA  Zio patch showed sinus rhythm with frequent PACs and PVCs.  Pulmonary evaluation including PFTs and chest imaging were normal.    -Patient was started on losartan and uptitrated to 100 mg daily for management of blood pressure.  -Started on heparin drip prior to transfer to [facility] -> now on DOAC  -Cardiology consulted.  N.p.o.  "after midnight -> janell guided ablation tomorrow  -Holding all other meds until med rec is complete.   -ASA stopped  -Cardiac monitoring.  -Holding losartan for now while on diltiazem drip.  Can consider restarting if persistently hypertensive despite diltiazem.  -Continue metoprolol 25 mg daily after med rec  -Continue atorvastatin 40 mg daily    #History of recurrent hives-patient is on levocetirizine and is very insistent that he needs to take this every day.      #Acute kidney injury-creatinine elevated at 1.27.  This was elevated 2 months ago but was within normal limits back in April.  Could be related to losartan which was uptitrated recently versus uncontrolled hypertension.  Holding losartan for now.  Repeat BMP stable today          Diet: Combination Diet Regular Diet Adult  NPO for Medical/Clinical Reasons Except for: Meds    DVT Prophylaxis: Pneumatic Compression Devices  Noyola Catheter: Not present  Lines: None     Cardiac Monitoring: ACTIVE order. Indication: Acute decompensated heart failure (48 hours)  Code Status: Full Code      Clinically Significant Risk Factors Present on Admission                 # Drug Induced Platelet Defect: home medication list includes an antiplatelet medication   # Hypertension: Noted on problem list         # Overweight: Estimated body mass index is 27.34 kg/m  as calculated from the following:    Height as of this encounter: 1.676 m (5' 6\").    Weight as of this encounter: 76.8 kg (169 lb 6.4 oz).       # Financial/Environmental Concerns:           Disposition Plan     Medically Ready for Discharge: Anticipated in 2-4 Days             Alex Neal MD  Hospitalist Service  [facility]  Securely message with [employer] (more info)  Text page via [employer] Paging/Directory   ______________________________________________________________________    Interval History     No CP/SOB  No lightheadedness  No fevers  No nausea / vomiting   No new " complaints    Physical Exam   Vital Signs: Temp: 98.1  F (36.7  C) Temp src: Oral BP: 121/79 Pulse: 71   Resp: 20 SpO2: 96 % O2 Device: None (Room air)    Weight: 169 lbs 6.4 oz    Physical Exam  Constitutional:       Appearance: Normal appearance.   Cardiovascular:      Rate and Rhythm: Tachycardia present. Rhythm irregular.      Pulses: Normal pulses.      Heart sounds: Normal heart sounds.   Pulmonary:      Effort: Pulmonary effort is normal. No respiratory distress.      Breath sounds: Normal breath sounds.   Abdominal:      General: Bowel sounds are normal. There is no distension.      Tenderness: There is no abdominal tenderness. There is no guarding.      Comments: Central obesity   Skin:     General: Skin is warm and dry.   Neurological:      General: No focal deficit present.   ----------------------------------------------------------------------------------------    Medical Decision Making       25 MINUTES SPENT BY ME on the date of service doing chart review, history, exam, documentation & further activities per the note.      Data   ------------------------- PAST 24 HR DATA REVIEWED -----------------------------------------------    I have personally reviewed the following data over the past 24 hrs:    7.6  \   12.4 (L)   / 161     139 105 17.9 /  99   4.1 27 1.11 \     Trop: 29 (H) BNP: N/A       Imaging results reviewed over the past 24 hrs:   No results found for this or any previous visit (from the past 24 hour(s)).  ------------------------- ENCOUNTER LABS ----------------------------------------------------------------  Recent Labs   Lab 10/20/24  0316 10/19/24  1900 10/15/24  1431   WBC 7.6 7.9  --    HGB 12.4* 13.8  --    MCV 91 93  --     201  --      --  141   POTASSIUM 4.1  --  4.8   CHLORIDE 105  --  105   CO2 27  --  26   BUN 17.9  --  24.2*   CR 1.11  --  1.27*   ANIONGAP 7  --  10   GABBY 8.8  --  9.5   GLC 99  --  100*       Most Recent 3 CBC's:  Recent Labs   Lab Test  10/20/24  0316 10/19/24  1900 06/27/24  0810 04/18/24  0405   WBC 7.6 7.9  --  6.3   HGB 12.4* 13.8 11.6 11.9*   MCV 91 93  --  93    201  --  192     Most Recent 3 BMP's:  Recent Labs   Lab Test 10/20/24  0316 10/15/24  1431 08/09/24  1411    141 142   POTASSIUM 4.1 4.8 4.5   CHLORIDE 105 105 105   CO2 27 26 27   BUN 17.9 24.2* 17.0   CR 1.11 1.27* 1.26*   ANIONGAP 7 10 10   GABBY 8.8 9.5 9.4   GLC 99 100* 101*     Most Recent 2 LFT's:  Recent Labs   Lab Test 08/01/24  1447 04/17/24  0847   AST  --  23   ALT 19 12   ALKPHOS  --  69   BILITOTAL  --  0.4     Most Recent 3 INR's:No lab results found.  Most Recent 3 Troponin's:No lab results found.  Most Recent 3 BNP's:  Recent Labs   Lab Test 04/17/24  0847   NTBNP 1,576     Most Recent D-dimer:  Recent Labs   Lab Test 04/17/24  0847   DD 1.56*     Most Recent Cholesterol Panel:  Recent Labs   Lab Test 08/01/24  1447   CHOL 142   LDL 41   HDL 61   TRIG 200*     Most Recent 6 Bacteria Isolates From Any Culture (See EPIC Reports for Culture Details):No lab results found.  Most Recent TSH and T4:No lab results found.  Most Recent Urinalysis:  Recent Labs   Lab Test 10/19/24  1942   COLOR Light Yellow   APPEARANCE Clear   URINEGLC Negative   URINEBILI Negative   URINEKETONE Negative   SG 1.019   UBLD Negative   URINEPH 5.5   PROTEIN Negative   NITRITE Negative   LEUKEST Negative   RBCU 1   WBCU <1     Most Recent ESR & CRP:No lab results found.

## 2024-10-20 NOTE — PHARMACY-ADMISSION MEDICATION HISTORY
Pharmacist Admission Medication History    Admission medication history is complete. The information provided in this note is only as accurate as the sources available at the time of the update.    Information Source(s): Patient, CareEverywhere/SureScripts, and prescription labels  via in-person    Pertinent Information: Patient takes respirine to help with breathing.     Changes made to PTA medication list:  Added: None  Deleted: avastin  Changed: None    Allergies reviewed with patient and updates made in EHR: yes - nkda     Medication History Completed By: Jojo Caro [facility] 10/19/2024 7:05 PM    PTA Med List   Medication Sig Last Dose    Ascorbic Acid (VITAMIN C PO) Take 500 mg by mouth as needed (when feeling sick/has a cold) Dose unclear. Possibly 500-1000 mg or 2-3 tabs Unknown    aspirin (ASA) 81 MG chewable tablet Take 1 tablet (81 mg) by mouth daily 10/18/2024    atorvastatin (LIPITOR) 40 MG tablet Take 1 tablet (40 mg) by mouth daily 10/18/2024    levocetirizine (XYZAL) 5 MG tablet Take 5 mg by mouth daily 10/19/2024    losartan (COZAAR) 100 MG tablet Take 1 tablet (100 mg) by mouth daily 10/18/2024    metoprolol succinate ER (TOPROL XL) 25 MG 24 hr tablet Take 1 tablet (25 mg) by mouth daily 10/18/2024    NONFORMULARY OTC respirine-food supplement take as needed Unknown    VITAMIN D PO Take by mouth as needed (when feeling sick/has a cold) 1000 international units Unknown

## 2024-10-21 ENCOUNTER — APPOINTMENT (OUTPATIENT)
Dept: CARDIOLOGY | Facility: CLINIC | Age: 89
DRG: 274 | End: 2024-10-21
Attending: INTERNAL MEDICINE
Payer: COMMERCIAL

## 2024-10-21 LAB — LVEF ECHO: NORMAL

## 2024-10-21 PROCEDURE — 272N000001 HC OR GENERAL SUPPLY STERILE: Performed by: INTERNAL MEDICINE

## 2024-10-21 PROCEDURE — 99232 SBSQ HOSP IP/OBS MODERATE 35: CPT | Mod: 25 | Performed by: NURSE PRACTITIONER

## 2024-10-21 PROCEDURE — 99152 MOD SED SAME PHYS/QHP 5/>YRS: CPT | Performed by: INTERNAL MEDICINE

## 2024-10-21 PROCEDURE — 250N000011 HC RX IP 250 OP 636: Performed by: INTERNAL MEDICINE

## 2024-10-21 PROCEDURE — C1730 CATH, EP, 19 OR FEW ELECT: HCPCS | Performed by: INTERNAL MEDICINE

## 2024-10-21 PROCEDURE — 999N000184 HC STATISTIC TELEMETRY

## 2024-10-21 PROCEDURE — 93320 DOPPLER ECHO COMPLETE: CPT | Mod: 26 | Performed by: INTERNAL MEDICINE

## 2024-10-21 PROCEDURE — C1731 CATH, EP, 20 OR MORE ELEC: HCPCS | Performed by: INTERNAL MEDICINE

## 2024-10-21 PROCEDURE — 93325 DOPPLER ECHO COLOR FLOW MAPG: CPT

## 2024-10-21 PROCEDURE — 93653 COMPRE EP EVAL TX SVT: CPT | Performed by: INTERNAL MEDICINE

## 2024-10-21 PROCEDURE — 4A0234Z MEASUREMENT OF CARDIAC ELECTRICAL ACTIVITY, PERCUTANEOUS APPROACH: ICD-10-PCS | Performed by: INTERNAL MEDICINE

## 2024-10-21 PROCEDURE — 250N000013 HC RX MED GY IP 250 OP 250 PS 637: Performed by: INTERNAL MEDICINE

## 2024-10-21 PROCEDURE — 02K83ZZ MAP CONDUCTION MECHANISM, PERCUTANEOUS APPROACH: ICD-10-PCS | Performed by: INTERNAL MEDICINE

## 2024-10-21 PROCEDURE — 93312 ECHO TRANSESOPHAGEAL: CPT | Mod: 26 | Performed by: INTERNAL MEDICINE

## 2024-10-21 PROCEDURE — 999N000183 HC STATISTIC TEE INCLUDES SEDATION

## 2024-10-21 PROCEDURE — C1894 INTRO/SHEATH, NON-LASER: HCPCS | Performed by: INTERNAL MEDICINE

## 2024-10-21 PROCEDURE — 99153 MOD SED SAME PHYS/QHP EA: CPT | Performed by: INTERNAL MEDICINE

## 2024-10-21 PROCEDURE — 210N000002 HC R&B HEART CARE

## 2024-10-21 PROCEDURE — 250N000009 HC RX 250: Performed by: INTERNAL MEDICINE

## 2024-10-21 PROCEDURE — 02583ZZ DESTRUCTION OF CONDUCTION MECHANISM, PERCUTANEOUS APPROACH: ICD-10-PCS | Performed by: INTERNAL MEDICINE

## 2024-10-21 PROCEDURE — C1887 CATHETER, GUIDING: HCPCS | Performed by: INTERNAL MEDICINE

## 2024-10-21 PROCEDURE — 99232 SBSQ HOSP IP/OBS MODERATE 35: CPT | Performed by: INTERNAL MEDICINE

## 2024-10-21 PROCEDURE — 250N000009 HC RX 250: Performed by: HOSPITALIST

## 2024-10-21 PROCEDURE — 93325 DOPPLER ECHO COLOR FLOW MAPG: CPT | Mod: 26 | Performed by: INTERNAL MEDICINE

## 2024-10-21 PROCEDURE — 258N000003 HC RX IP 258 OP 636: Performed by: HOSPITALIST

## 2024-10-21 PROCEDURE — 250N000013 HC RX MED GY IP 250 OP 250 PS 637: Performed by: HOSPITALIST

## 2024-10-21 PROCEDURE — C1732 CATH, EP, DIAG/ABL, 3D/VECT: HCPCS | Performed by: INTERNAL MEDICINE

## 2024-10-21 RX ORDER — NALOXONE HYDROCHLORIDE 0.4 MG/ML
0.2 INJECTION, SOLUTION INTRAMUSCULAR; INTRAVENOUS; SUBCUTANEOUS
Status: DISCONTINUED | OUTPATIENT
Start: 2024-10-21 | End: 2024-10-21 | Stop reason: HOSPADM

## 2024-10-21 RX ORDER — FLUMAZENIL 0.1 MG/ML
0.2 INJECTION, SOLUTION INTRAVENOUS
Status: DISCONTINUED | OUTPATIENT
Start: 2024-10-21 | End: 2024-10-21 | Stop reason: HOSPADM

## 2024-10-21 RX ORDER — FENTANYL CITRATE 50 UG/ML
INJECTION, SOLUTION INTRAMUSCULAR; INTRAVENOUS
Status: DISCONTINUED | OUTPATIENT
Start: 2024-10-21 | End: 2024-10-21 | Stop reason: HOSPADM

## 2024-10-21 RX ORDER — SODIUM CHLORIDE 9 MG/ML
1000 INJECTION, SOLUTION INTRAVENOUS CONTINUOUS
Status: DISCONTINUED | OUTPATIENT
Start: 2024-10-21 | End: 2024-10-21 | Stop reason: HOSPADM

## 2024-10-21 RX ORDER — LIDOCAINE HYDROCHLORIDE 40 MG/ML
1.5 SOLUTION TOPICAL ONCE
Status: COMPLETED | OUTPATIENT
Start: 2024-10-21 | End: 2024-10-21

## 2024-10-21 RX ORDER — LIDOCAINE 40 MG/G
CREAM TOPICAL
Status: DISCONTINUED | OUTPATIENT
Start: 2024-10-21 | End: 2024-10-21 | Stop reason: HOSPADM

## 2024-10-21 RX ORDER — BENZOCAINE/MENTHOL 6 MG-10 MG
1 LOZENGE MUCOUS MEMBRANE 3 TIMES DAILY PRN
Status: DISCONTINUED | OUTPATIENT
Start: 2024-10-21 | End: 2024-10-22 | Stop reason: HOSPADM

## 2024-10-21 RX ORDER — NALOXONE HYDROCHLORIDE 0.4 MG/ML
0.4 INJECTION, SOLUTION INTRAMUSCULAR; INTRAVENOUS; SUBCUTANEOUS
Status: DISCONTINUED | OUTPATIENT
Start: 2024-10-21 | End: 2024-10-21 | Stop reason: HOSPADM

## 2024-10-21 RX ORDER — FENTANYL CITRATE 50 UG/ML
25 INJECTION, SOLUTION INTRAMUSCULAR; INTRAVENOUS
Status: DISCONTINUED | OUTPATIENT
Start: 2024-10-21 | End: 2024-10-21 | Stop reason: HOSPADM

## 2024-10-21 RX ORDER — CEFAZOLIN SODIUM 1 G/3ML
1 INJECTION, POWDER, FOR SOLUTION INTRAMUSCULAR; INTRAVENOUS
Status: DISCONTINUED | OUTPATIENT
Start: 2024-10-21 | End: 2024-10-21 | Stop reason: HOSPADM

## 2024-10-21 RX ORDER — DEXTROSE MONOHYDRATE 25 G/50ML
9.5 INJECTION, SOLUTION INTRAVENOUS
Status: DISCONTINUED | OUTPATIENT
Start: 2024-10-21 | End: 2024-10-21 | Stop reason: HOSPADM

## 2024-10-21 RX ORDER — GLYCOPYRROLATE 0.2 MG/ML
0.1 INJECTION, SOLUTION INTRAMUSCULAR; INTRAVENOUS ONCE
Status: COMPLETED | OUTPATIENT
Start: 2024-10-21 | End: 2024-10-21

## 2024-10-21 RX ORDER — LIDOCAINE 50 MG/G
OINTMENT TOPICAL ONCE
Status: COMPLETED | OUTPATIENT
Start: 2024-10-21 | End: 2024-10-21

## 2024-10-21 RX ORDER — MIDAZOLAM HCL IN 0.9 % NACL/PF 1 MG/ML
.5-6 PLASTIC BAG, INJECTION (ML) INTRAVENOUS CONTINUOUS PRN
Status: DISCONTINUED | OUTPATIENT
Start: 2024-10-21 | End: 2024-10-21 | Stop reason: HOSPADM

## 2024-10-21 RX ORDER — MAGNESIUM HYDROXIDE/ALUMINUM HYDROXICE/SIMETHICONE 120; 1200; 1200 MG/30ML; MG/30ML; MG/30ML
30 SUSPENSION ORAL EVERY 8 HOURS PRN
Status: DISCONTINUED | OUTPATIENT
Start: 2024-10-21 | End: 2024-10-22 | Stop reason: HOSPADM

## 2024-10-21 RX ORDER — ACETAMINOPHEN 325 MG/1
650 TABLET ORAL EVERY 4 HOURS PRN
Status: DISCONTINUED | OUTPATIENT
Start: 2024-10-21 | End: 2024-10-21

## 2024-10-21 RX ORDER — KETOROLAC TROMETHAMINE 15 MG/ML
INJECTION, SOLUTION INTRAMUSCULAR; INTRAVENOUS
Status: DISCONTINUED | OUTPATIENT
Start: 2024-10-21 | End: 2024-10-21 | Stop reason: HOSPADM

## 2024-10-21 RX ORDER — ACETAMINOPHEN 325 MG/1
650 TABLET ORAL EVERY 4 HOURS PRN
Status: DISCONTINUED | OUTPATIENT
Start: 2024-10-21 | End: 2024-10-22 | Stop reason: HOSPADM

## 2024-10-21 RX ADMIN — APIXABAN 5 MG: 5 TABLET, FILM COATED ORAL at 20:57

## 2024-10-21 RX ADMIN — MIDAZOLAM 0.5 MG: 1 INJECTION INTRAMUSCULAR; INTRAVENOUS at 13:19

## 2024-10-21 RX ADMIN — ATORVASTATIN CALCIUM 40 MG: 40 TABLET, FILM COATED ORAL at 09:04

## 2024-10-21 RX ADMIN — FENTANYL CITRATE 25 MCG: 50 INJECTION, SOLUTION INTRAMUSCULAR; INTRAVENOUS at 13:12

## 2024-10-21 RX ADMIN — SENNOSIDES AND DOCUSATE SODIUM 1 TABLET: 50; 8.6 TABLET ORAL at 20:57

## 2024-10-21 RX ADMIN — FENTANYL CITRATE 25 MCG: 50 INJECTION, SOLUTION INTRAMUSCULAR; INTRAVENOUS at 13:14

## 2024-10-21 RX ADMIN — LEVOCETIRIZINE DIHYDROCHLORIDE 5 MG: 5 TABLET ORAL at 09:08

## 2024-10-21 RX ADMIN — LIDOCAINE HYDROCHLORIDE 1.5 ML: 40 SOLUTION TOPICAL at 12:52

## 2024-10-21 RX ADMIN — GLYCOPYRROLATE 0.1 MG: 0.2 INJECTION, SOLUTION INTRAMUSCULAR; INTRAVENOUS at 12:45

## 2024-10-21 RX ADMIN — TOPICAL ANESTHETIC 1 ML: 200 SPRAY DENTAL; PERIODONTAL at 13:05

## 2024-10-21 RX ADMIN — MIDAZOLAM 1 MG: 1 INJECTION INTRAMUSCULAR; INTRAVENOUS at 13:13

## 2024-10-21 RX ADMIN — MIDAZOLAM 1 MG: 1 INJECTION INTRAMUSCULAR; INTRAVENOUS at 13:11

## 2024-10-21 RX ADMIN — DILTIAZEM HYDROCHLORIDE 5 MG/HR: 5 INJECTION, SOLUTION INTRAVENOUS at 11:42

## 2024-10-21 RX ADMIN — APIXABAN 5 MG: 5 TABLET, FILM COATED ORAL at 09:04

## 2024-10-21 ASSESSMENT — ACTIVITIES OF DAILY LIVING (ADL)
ADLS_ACUITY_SCORE: 22
ADLS_ACUITY_SCORE: 23
ADLS_ACUITY_SCORE: 22
ADLS_ACUITY_SCORE: 23
ADLS_ACUITY_SCORE: 22
ADLS_ACUITY_SCORE: 23
ADLS_ACUITY_SCORE: 22
ADLS_ACUITY_SCORE: 22
ADLS_ACUITY_SCORE: 23
ADLS_ACUITY_SCORE: 22

## 2024-10-21 NOTE — PRE-PROCEDURE
GENERAL PRE-PROCEDURE:   Procedure:  EP study and ablation  Date/Time:  10/21/2024 1:49 PM    Written consent obtained?: Yes    Risks and benefits: Risks, benefits and alternatives were discussed    Consent given by:  Patient  Patient states understanding of procedure being performed: Yes    Patient's understanding of procedure matches consent: Yes    Procedure consent matches procedure scheduled: Yes    Expected level of sedation:  Moderate  Appropriately NPO:  Yes  ASA Class:  2  Mallampati  :  Grade 2- soft palate, base of uvula, tonsillar pillars, and portion of posterior pharyngeal wall visible  Lungs:  Lungs clear with good breath sounds bilaterally  Heart:  A-flutter  History & Physical reviewed:  History and physical reviewed and no updates needed  Statement of review:  I have reviewed the lab findings, diagnostic data, medications, and the plan for sedation

## 2024-10-21 NOTE — PROGRESS NOTES
[facility]    Medicine Progress Note - Hospitalist Service    Date of Admission:  10/19/2024  Interval History   Patient not seen till later in the day as he was off to get his procedure done for part of the day.  On return he reports that he is feeling better.  Did not ever feel like his heart was racing.  His wife is at the bedside.  Status post ablation.  Monitor overnight and anticipate discharge tomorrow    Assessment & Plan   Tom Summers is a 89 year old male with a history of nonischemic cardiomyopathy, nonobstructive coronary artery disease, hypertension, skin cancer, retinal vein occlusion who presents with progressive worsening dyspnea on exertion and was found to be in new onset atrial flutter.      Patient initially presented to [facility] emergency room with complaints of high blood pressure and heart rate.  Found to have heart rate in the 140s for about 5 days.  Given 5 mg IV metoprolol, Toprol XL 25 mg.  Discussed with cardiology at [facility] and transferred.  .  Creatinine 1.33.  Chest x-ray negative.  Placed on IV heparin drip     #New onset atrial flutter  Nonischemic cardiomyopathy  Hypertension  Hyperlipidemia  - Presentation to [facility] emergency room on 10/19 found to be in atrial flutter with a heart rate of 140  -Previously seen by cardiology 4/2024 Dr. Ashraf evaluation for shortness of breath   -Echo 4/17/2024 showed EF 52% with no significant valvular disease.  -Coronary angiogram on 4/18/2024 showed no significant coronary artery disease.  Had 50% proximal to mid LAD and 50% first diagonal lesion stenosis.  -Cardiac MRI was negative for ischemia with EF 68%, no MI, scar or infiltrative disease, right ventricular EF 52%, ELANA  -Zio patch showed sinus rhythm with frequent PACs and PVCs.  Pulmonary evaluation including PFTs and chest imaging were normal.  CT PE study showing no evidence of pulmonary embolus.  Minimal peripheral fibrosis lung bases.   "Incidental 4 mm right upper lobe pulmonary nodule.   - Patient was started on losartan and uptitrated to 100 mg daily for management of blood pressure.  -  Started on heparin drip prior to transfer to [facility] -> now on DOAC (ASA PTA not resumed )   - Cardiology consulted.  N.p.o. after midnight -> janell guided ablation 10/21   - ASA stopped currently  - troponin 31 >>29 flat with negative recent ischemic evaluation (defer to cardiology) suspect supply/demand   - Cardiac monitoring.  - Currently on diltiazem drip. (Metoprolol 25 mg XL PTA on hold)   - Continue atorvastatin 40 mg daily     #History of recurrent hives-  - patient is on levocetirizine and is very insistent that he needs to take this every day.       #Acute kidney injury-  - Creatinine elevated at 1.27.    - Elevated 2 months ago but was within normal limits back in April.  (Started on cozaar after April)   - potentially if related to losartan up titration now on hold     4 mm RUL pulmonary nodule  - follow up with primary   - discuss with patient          Diet: NPO for Medical/Clinical Reasons Except for: Meds    DVT Prophylaxis: DOAC  Noyola Catheter: Not present  Lines: None     Cardiac Monitoring: ACTIVE order. Indication: Acute decompensated heart failure (48 hours)  Code Status: Full Code      Clinically Significant Risk Factors                   # Hypertension: Noted on problem list           # Overweight: Estimated body mass index is 27.26 kg/m  as calculated from the following:    Height as of this encounter: 1.676 m (5' 6\").    Weight as of this encounter: 76.6 kg (168 lb 14.4 oz)., PRESENT ON ADMISSION     # Financial/Environmental Concerns:           Disposition Plan     Medically Ready for Discharge: Anticipated Tomorrow             ZAKIA JIMENEZ MD  Hospitalist Service  [facility]  Securely message with [employer] [facility] (more info)  Text page via [employer] Paging/Directory "   ______________________________________________________________________    Physical Exam   Vital Signs: Temp: 98.7  F (37.1  C) Temp src: Oral BP: 128/83 Pulse: 69   Resp: 16 SpO2: 95 % O2 Device: None (Room air)    Weight: 168 lbs 14.4 oz    General Appearance: Patient lying in bed comfortable.  Respiratory: Clear to auscultation bilaterally without wheezes or rhonchi  Cardiovascular: Regular rate and rhythm without gallop rub normal S1-S2  GI: Positive bowel sounds soft with no rebound guarding tender  Skin: no rashes     Medical Decision Making             Data         Imaging results reviewed over the past 24 hrs:   No results found for this or any previous visit (from the past 24 hour(s)).

## 2024-10-21 NOTE — CONSULTS
Patient has Medicare Advantage through [employer].    Xarelto/Eliquis  --Upon receipt of RX, Discharge Pharmacy can provide 1 mo free.  --Patient will pay 100% of the first $250 in drugs costs ($250 remains; first fill will be as much as $291)  --Subsequent fills will be $47/mo.  --lf/when total drug costs exceed $5,030, price will increase to a 25% coinsurance, equivalent to $144/mo.    Shonna Ramsay  Pharmacy Technician/Liaison, Discharge Pharmacy   [phone] (voice or text)  [email or web address]  Available on [facility] and Teams

## 2024-10-21 NOTE — PROGRESS NOTES
[facility]  Electrophysiology Progress Note  Date of Admission: 10/19/2024  Date of Service: 10/21/2024  Primary Cardiologist: Dr. Ashraf ([facility])    Assessment & Plan   Tom Summers is a 89 year old male with past medical history significant for NICM, nonobstructive CAD, HLP, HTN and frequent PACs. He was admitted on 10/19/2024 with progressive LERMA from [place], [place] ED and was found to be in new atrial flutter with RVR and elevated BP.   Interval History:  Rates well controlled on dilt gtt.    Assessment:   New symptomatic typical atrial flutter with RVR  Heart rates had been in ~140s for approximately 5 days prior to admission   Met with Dr. Infante () on 10/20 and discussed PATRICE guided DCCV vs PATRICE guided AFL ablation. Patient opted for the latter.   Heparin was stopped and placed on Eliquis 5 mg BID    Currently HR well controlled on diltiazem gtt at 5 mg/hr  [id number] Score 3: (age ++, HTN)  Zio Patch 4/2024: no AF or AFL with PAC burden 10% and PVCs 1.1%    Plan:  Plan for PATRICE guided atrial flutter ablation   Eliquis for at least 30 days post procedure  Follow up currently scheduled with FARHAD Carranza, CNP    Xarelto/Eliquis  --Upon receipt of RX, Discharge Pharmacy can provide 1 mo free.  --Patient will pay 100% of the first $250 in drugs costs ($250 remains; first fill will be as much as $291)  --Subsequent fills will be $47/mo.  --lf/when total drug costs exceed $5,030, price will increase to a 25% coinsurance, equivalent to $144/mo.    FARHAD Angulo CNP  Pager:  [phone]  EP service:  M-F ([phone])     Physical Exam   Temp: 98.7  F (37.1  C) Temp src: Oral BP: 128/83 Pulse: 69   Resp: 16 SpO2: 95 % O2 Device: None (Room air)    Vitals:    10/19/24 1800 10/20/24 0615 10/21/24 0600   Weight: 79.3 kg (174 lb 12.8 oz) 76.8 kg (169 lb 6.4 oz) 76.6 kg (168 lb 14.4 oz)       Constitutional:   NAD    Skin:   Warm and dry   Head:    Nontraumatic   Neck:   no JVD   Lungs:   symmetric, clear to auscultation   Cardiovascular:   regularly irregular rhythm   Abdomen:   Benign    Extremities and Back:   No edema   Neurological:   Grossly nonfocal     Medications   Current Facility-Administered Medications   Medication Dose Route Frequency Provider Last Rate Last Admin    diltiazem (CARDIZEM) 125 mg in sodium chloride 0.9 % 125 mL infusion  5-15 mg/hr Intravenous Continuous Lara Brown MD 5 mL/hr at 10/21/24 0756 5 mg/hr at 10/21/24 0756    Patient is already receiving anticoagulation with heparin, enoxaparin (LOVENOX), warfarin (COUMADIN)  or other anticoagulant medication   Does not apply Continuous PRN Lara Brown MD         Current Facility-Administered Medications   Medication Dose Route Frequency Provider Last Rate Last Admin    apixaban ANTICOAGULANT (ELIQUIS) tablet 5 mg  5 mg Oral BID Jose De Jesus Infante MD   5 mg at 10/20/24 2143    atorvastatin (LIPITOR) tablet 40 mg  40 mg Oral Daily Lara Brown MD   40 mg at 10/20/24 0827    levocetirizine (XYZAL) tablet 5 mg  5 mg Oral Daily Lara Brown MD        senna-docusate (SENOKOT-S/PERICOLACE) 8.6-50 MG per tablet 1 tablet  1 tablet Oral BID Lara Brown MD   1 tablet at 10/20/24 2143    Or    senna-docusate (SENOKOT-S/PERICOLACE) 8.6-50 MG per tablet 2 tablet  2 tablet Oral BID Lara Brown MD        sodium chloride (PF) 0.9% PF flush 3 mL  3 mL Intracatheter Q8H Lara Brown MD           Code Status    Full Code    Allergies   No Known Allergies

## 2024-10-21 NOTE — PLAN OF CARE
Goal Outcome Evaluation:    Goal Outcome Evaluation:  DATE & TIME: 10/20/2024, 7559- [id number]   Cognitive Concerns/ Orientation : A & O times four   BEHAVIOR & AGGRESSION TOOL COLOR: calm       ABNL VS/O2: VSS, room air  MOBILITY: stand by assist  PAIN MANAGMENT: denied  DIET: regular  BOWEL/BLADDER: voids per BRP  ABNL LAB/BG:   DRAIN/DEVICES: PIV infusing  TELEMETRY RHYTHM: a-flutter CVR  SKIN: WDL  TESTS/PROCEDURES: edwardo guided ablation tomorrow  D/C DATE: pending  OTHER IMPORTANT INFO:  Here for SOB, a-flutter. On diltiazem gtt., and Oral anticoagulation.   Plan: Edwardo guided ablation tomorrow. Will be NPO after midnight.   Continue to monitor.

## 2024-10-21 NOTE — PROGRESS NOTES
Dictated.    Typical CTI flutter, counterclockwise.    Long isthmus with pouch in the middle. Successful ablation with flutter termination and bidirectional block.    EBL 20 ML.  No apparent complication.      Plan:  -continue anticoagulation for at least one month  -bedrest for 4 hours

## 2024-10-21 NOTE — PLAN OF CARE
Pt stable over night, VSS, no c/o Chest pain or sob.  Tele:A Flutter CVR on a Cardizem gtt at 5mg/hr.  He is NPO for a PATRICE/Ablation today.  Pt slept well over night, no new issues noted.  Plan of care is on going.

## 2024-10-21 NOTE — PRE-PROCEDURE
GENERAL PRE-PROCEDURE:   Procedure:  PATRICE followed by ablation    Verbal consent obtained?: Yes    Written consent obtained?: Yes    Risks and benefits: Risks, benefits and alternatives were discussed    DC Plan: Appropriate discharge home plan in place for patients who are going home after procedure   Consent given by:  Patient  Patient states understanding of procedure being performed: Yes    Patient's understanding of procedure matches consent: Yes    Procedure consent matches procedure scheduled: Yes    Expected level of sedation:  Moderate  ASA Class:  2  Mallampati  :  Grade 2- soft palate, base of uvula, tonsillar pillars, and portion of posterior pharyngeal wall visible  Heart:  A-flutter

## 2024-10-21 NOTE — PROGRESS NOTES
Care Suites Procedure Nursing Note    Patient Information  Name: Tom Summers  Age: 89 year old    Procedure  Procedure: PATRICE  Procedure start time: 13:10  Procedure complete time: 13:30  Pt tolerated well. VSS.  See Procedural Sedation Flowsheet.    Total sedation given 2.5 mg Versed & 50 mcg Fentanyl.     Detailed report given to DIPTI Jewell at bedside.   Pt transferred to EP lab per cart for Ablation.  Patient drowsy but arousable.  Resp even & unlabored upon transfer.

## 2024-10-22 ENCOUNTER — ORDERS ONLY (AUTO-RELEASED) (OUTPATIENT)
Dept: CARDIOLOGY | Facility: CLINIC | Age: 89
End: 2024-10-22

## 2024-10-22 VITALS
HEIGHT: 66 IN | DIASTOLIC BLOOD PRESSURE: 89 MMHG | BODY MASS INDEX: 27.21 KG/M2 | HEART RATE: 81 BPM | SYSTOLIC BLOOD PRESSURE: 146 MMHG | RESPIRATION RATE: 16 BRPM | TEMPERATURE: 98.1 F | OXYGEN SATURATION: 98 % | WEIGHT: 169.3 LBS

## 2024-10-22 DIAGNOSIS — I48.3 TYPICAL ATRIAL FLUTTER (H): ICD-10-CM

## 2024-10-22 LAB
ANION GAP SERPL CALCULATED.3IONS-SCNC: 7 MMOL/L (ref 7–15)
ANION GAP SERPL CALCULATED.3IONS-SCNC: 8 MMOL/L (ref 7–15)
ATRIAL RATE - MUSE: 71 BPM
BUN SERPL-MCNC: 27.1 MG/DL (ref 8–23)
BUN SERPL-MCNC: 28.4 MG/DL (ref 8–23)
CALCIUM SERPL-MCNC: 8.8 MG/DL (ref 8.8–10.4)
CALCIUM SERPL-MCNC: 9.5 MG/DL (ref 8.8–10.4)
CHLORIDE SERPL-SCNC: 106 MMOL/L (ref 98–107)
CHLORIDE SERPL-SCNC: 107 MMOL/L (ref 98–107)
CREAT SERPL-MCNC: 1.27 MG/DL (ref 0.67–1.17)
CREAT SERPL-MCNC: 1.39 MG/DL (ref 0.67–1.17)
DIASTOLIC BLOOD PRESSURE - MUSE: NORMAL MMHG
EGFRCR SERPLBLD CKD-EPI 2021: 49 ML/MIN/1.73M2
EGFRCR SERPLBLD CKD-EPI 2021: 55 ML/MIN/1.73M2
GLUCOSE SERPL-MCNC: 109 MG/DL (ref 70–99)
GLUCOSE SERPL-MCNC: 96 MG/DL (ref 70–99)
HCO3 SERPL-SCNC: 27 MMOL/L (ref 22–29)
HCO3 SERPL-SCNC: 27 MMOL/L (ref 22–29)
INTERPRETATION ECG - MUSE: NORMAL
P AXIS - MUSE: 4 DEGREES
POTASSIUM SERPL-SCNC: 4.3 MMOL/L (ref 3.4–5.3)
POTASSIUM SERPL-SCNC: 4.6 MMOL/L (ref 3.4–5.3)
PR INTERVAL - MUSE: 174 MS
QRS DURATION - MUSE: 78 MS
QT - MUSE: 392 MS
QTC - MUSE: 425 MS
R AXIS - MUSE: 51 DEGREES
SODIUM SERPL-SCNC: 140 MMOL/L (ref 135–145)
SODIUM SERPL-SCNC: 142 MMOL/L (ref 135–145)
SYSTOLIC BLOOD PRESSURE - MUSE: NORMAL MMHG
T AXIS - MUSE: 268 DEGREES
VENTRICULAR RATE- MUSE: 71 BPM

## 2024-10-22 PROCEDURE — 80048 BASIC METABOLIC PNL TOTAL CA: CPT | Performed by: INTERNAL MEDICINE

## 2024-10-22 PROCEDURE — 250N000013 HC RX MED GY IP 250 OP 250 PS 637: Performed by: INTERNAL MEDICINE

## 2024-10-22 PROCEDURE — 93005 ELECTROCARDIOGRAM TRACING: CPT

## 2024-10-22 PROCEDURE — 250N000013 HC RX MED GY IP 250 OP 250 PS 637: Performed by: HOSPITALIST

## 2024-10-22 PROCEDURE — 82310 ASSAY OF CALCIUM: CPT | Performed by: INTERNAL MEDICINE

## 2024-10-22 PROCEDURE — 99232 SBSQ HOSP IP/OBS MODERATE 35: CPT | Performed by: INTERNAL MEDICINE

## 2024-10-22 PROCEDURE — 36415 COLL VENOUS BLD VENIPUNCTURE: CPT | Performed by: INTERNAL MEDICINE

## 2024-10-22 PROCEDURE — 99239 HOSP IP/OBS DSCHRG MGMT >30: CPT | Performed by: INTERNAL MEDICINE

## 2024-10-22 PROCEDURE — 258N000003 HC RX IP 258 OP 636: Performed by: INTERNAL MEDICINE

## 2024-10-22 RX ORDER — METOPROLOL SUCCINATE 25 MG/1
25 TABLET, EXTENDED RELEASE ORAL DAILY
Status: DISCONTINUED | OUTPATIENT
Start: 2024-10-22 | End: 2024-10-22

## 2024-10-22 RX ADMIN — ATORVASTATIN CALCIUM 40 MG: 40 TABLET, FILM COATED ORAL at 08:10

## 2024-10-22 RX ADMIN — APIXABAN 5 MG: 5 TABLET, FILM COATED ORAL at 08:10

## 2024-10-22 RX ADMIN — SODIUM CHLORIDE 500 ML: 9 INJECTION, SOLUTION INTRAVENOUS at 12:43

## 2024-10-22 RX ADMIN — METOPROLOL SUCCINATE 12.5 MG: 25 TABLET, EXTENDED RELEASE ORAL at 12:02

## 2024-10-22 ASSESSMENT — ACTIVITIES OF DAILY LIVING (ADL)
ADLS_ACUITY_SCORE: 21
ADLS_ACUITY_SCORE: 23
ADLS_ACUITY_SCORE: 21
ADLS_ACUITY_SCORE: 23
ADLS_ACUITY_SCORE: 21
ADLS_ACUITY_SCORE: 23
ADLS_ACUITY_SCORE: 21
ADLS_ACUITY_SCORE: 23
ADLS_ACUITY_SCORE: 21
ADLS_ACUITY_SCORE: 23
ADLS_ACUITY_SCORE: 23
ADLS_ACUITY_SCORE: 21

## 2024-10-22 NOTE — PROGRESS NOTES
[facility]  Electrophysiology Progress Note  Date of Admission: 10/19/2024  Date of Service: 10/22/2024  Primary Cardiologist: Dr. Ashraf ([facility])    Assessment & Plan   Tom Summers is a 89 year old male with past medical history significant for NICM, nonobstructive CAD, HLP, HTN and frequent PACs. He was admitted on 10/19/2024 with progressive LERMA from [place], [place] ED and was found to be in new atrial flutter with RVR (HR elevated to 140's ) and elevated BP.     Interval History:  Rates and BP well controlled on dilt gtt. Started on Eliquis 5mg BID.Mild trop elevation up to 31, trended down, suspect demand.     PATRICE completed 10/21: Ejection fraction is 55-60%. No thrombus is detected in the left atrial appendage. Mild mitral valve prolapse, bileaflet. There is moderate (2+) mitral regurgitation. left atrium is mild to moderately dilated.     10/21/24 - Successful atrial flutter ablation. Dilt gtt stopped post-ablation. HR controlled w/o rate control agent.  EKG 10/22 - SR  HR 71.  At bedside monitor noted SR with frequent PAC's.     Site - Rt groin site CDI, no noted bruising or bruit.       Assessment:   New symptomatic typical atrial flutter with RVR  Heart rates had been in ~140s for approximately 5 days prior to admission   Met with Dr. Infante (EP) on 10/20 and discussed PATRICE guided DCCV vs PATRICE guided AFL ablation. Patient opted for the latter.   Heparin was stopped and placed on Eliquis 5 mg BID    Currently HR well controlled on diltiazem gtt at 5 mg/hr  [id number] Score 3: (age ++, HTN)  Zio Patch 4/2024: no AF or AFL with PAC burden 10% and PVCs 1.1%, repeat Zio patch after discharge as pts with AFL tend to have AF in the future, leona in his case due to known freq pacs.     Plan:  Eliquis for at least 30 days post procedure  14 day Zio patch after discharge, prior to JEN follow-up   Follow up currently scheduled with Rima Kevin PA-C in [place] on 11/19 at 11:35  am  Per cards, okay to discharge    Xarelto/Eliquis  --Upon receipt of RX, Discharge Pharmacy can provide 1 mo free.  --Patient will pay 100% of the first $250 in drugs costs ($250 remains; first fill will be as much as $291)  --Subsequent fills will be $47/mo.  --lf/when total drug costs exceed $5,030, price will increase to a 25% coinsurance, equivalent to $144/mo.    Ange Mckeon ACNP Student   FARHAD Oliva, CNP    Pager:  [phone]  EP service:  M-F ([phone])     Physical Exam   Temp: 97.8  F (36.6  C) Temp src: Oral BP: (!) 155/97 Pulse: 83   Resp: 16 SpO2: 97 % O2 Device: None (Room air) Oxygen Delivery: 4 LPM  Vitals:    10/20/24 0615 10/21/24 0600 10/22/24 0425   Weight: 76.8 kg (169 lb 6.4 oz) 76.6 kg (168 lb 14.4 oz) 76.8 kg (169 lb 4.8 oz)       Constitutional:   NAD    Skin:   Warm and dry   Head:   Nontraumatic   Neck:   no JVD   Lungs:   symmetric, clear to auscultation   Cardiovascular:   irregular rhythm, normal rate, no noted murmur   Abdomen:   Benign    Extremities and Back:   No edema   Neurological:   Grossly nonfocal     Medications   Current Facility-Administered Medications   Medication Dose Route Frequency Provider Last Rate Last Admin    diltiazem (CARDIZEM) 125 mg in sodium chloride 0.9 % 125 mL infusion  5-15 mg/hr Intravenous Continuous Lara Brown MD   Stopped at 10/21/24 1351    Patient is already receiving anticoagulation with heparin, enoxaparin (LOVENOX), warfarin (COUMADIN)  or other anticoagulant medication   Does not apply Continuous PRN Lara Brown MD         Current Facility-Administered Medications   Medication Dose Route Frequency Provider Last Rate Last Admin    apixaban ANTICOAGULANT (ELIQUIS) tablet 5 mg  5 mg Oral BID Jose De Jesus Infante MD   5 mg at 10/21/24 2057    atorvastatin (LIPITOR) tablet 40 mg  40 mg Oral Daily Lara Brown MD   40 mg at 10/21/24 0904    levocetirizine (XYZAL) tablet 5 mg  5 mg Oral Daily Lara Brown MD   5  mg at 10/21/24 0908    senna-docusate (SENOKOT-S/PERICOLACE) 8.6-50 MG per tablet 1 tablet  1 tablet Oral BID Lara Brown MD   1 tablet at 10/21/24 2057    Or    senna-docusate (SENOKOT-S/PERICOLACE) 8.6-50 MG per tablet 2 tablet  2 tablet Oral BID Lara Brown MD        sodium chloride (PF) 0.9% PF flush 3 mL  3 mL Intracatheter Q8H Lara Brown MD           Code Status    Full Code    Allergies   No Known Allergies

## 2024-10-22 NOTE — PLAN OF CARE
Goal Outcome Evaluation:      Plan of Care Reviewed With: patient, spouse      Patient alert, SBA prior to aflutter ablation. Has been on bedrest since returning from procedure. Successful ablation , now in SR freq PAC's occ PVC's. Right groin site some oozing of blood after stopcock suture remove. Thrombix applied with pressure held x 2. Pt has cough s/p PATRICE. VSS.

## 2024-10-22 NOTE — PLAN OF CARE
Goal Outcome Evaluation:      Plan of Care Reviewed With: patient      Recd discharge order, creat improved after bolus. Pt will recheck BMP on Thursday. Pt/wife understand importance of this. Ambulated in terry and tolerated well. Review discharge instructions including follow up, new/change of meds and when to take. Groin site instructions. Zio patch will be mailed. Discharge to home with wife.

## 2024-10-22 NOTE — DISCHARGE INSTRUCTIONS
EP Study or Ablation Discharge Instructions - Femoral    After you go home:    Have an adult stay with you until tomorrow.  You may resume your normal diet.       For 24 hours - due to the sedation you received:  Relax and take it easy.  Do NOT make any important or legal decisions.  Do NOT drive or operate machines at home or at work.  Do NOT drink alcohol.    Care of Groin Puncture Site:    For the first 24 hrs - check the puncture site every 1-2 hours while awake.  For 2 days, when you cough, sneeze, laugh or move your bowels, hold your hand over the puncture site and press firmly.  Remove the bandaid after 24 hours. If there is minor oozing, apply another bandaid and remove it after 12 hours.  It is normal to have a small bruise or pea size lump at the site.  You may shower tomorrow. Do NOT take a bath, or use a hot tub or pool for at least 3 days. Do NOT scrub the site. Do not use lotion or powder near the puncture site.    Activity:            For 2 days:  No stooping or squatting  Do NOT do any heavy activity such as exercise, lifting, or straining.   No housework, yard work or any activity that make you sweat  Do NOT lift more than 10 pounds    Bleeding:    If you start bleeding from the site in your groin, lie down flat and press firmly on the site for 10 minutes.   Once bleeding stops, lay flat for 2 hours.  Call [facility] as soon as you can.       Call 911 right away if you have heavy bleeding or bleeding that does not stop.      Medicines:    Take your medications, including blood thinners, unless your provider tells you not to.  If you have stopped any other medicines, check with your provider about when to restart them.  If you have pain or shortness of breath, you may take Advil (ibuprofen) or Tylenol (acetaminophen).    Follow Up Appointments:    Follow up with [facility] Nurse Practitioner at [facility] of patient preference in 7-10 days.    Call the clinic if:    You have increased pain  or a large or growing hard lump around the site.  The site is red, swollen, hot or tender.  Blood or fluid is draining from the site.  You have chills or a fever greater than 101 F (38 C).  Your leg turns feels numb, cool or changes color.  Increased pain in the chest and/or groin.  New pain in the back or belly that you cannot control with Tylenol.  Shortness of breath or chest pain that is not relieved by Advil or Tylenol.  Recurrent irregular or fast heart rate lasting over 2 hours.  Any questions or concerns.    Heart rhythms:    You may have some premature heartbeats. These feel very strong. They may make you feel that the fast heart rhythm (tachycardia) is going to start again.  Give it time. The premature beats should occur less often.       [facility] [place]:    [phone] [facility] (7 days a week)

## 2024-10-22 NOTE — PROGRESS NOTES
PCP follow up appointment arranged as below:    FOLLOW UP WITH DIANA SUTHERLAND ON TUESDAY, OCTOBER 29TH AT 1:20 PM AT:     [facility]   [address] [place], [place] [id number] [phone]

## 2024-10-22 NOTE — PROGRESS NOTES
Rt groin site bleeding at change of shift, pressure held by outgoing nurse about 10 minutes and redressed. Site saturated again around 2030, dressing changed pressure held and hemostasis achieved about 2050. No bleeding since, site CDI soft and stable. Off of bedrest, up SBA in room. Using the urinal, 200 ml dark armin. No complaints. Remains SR. Appears to have slept well.

## 2024-10-23 ENCOUNTER — TELEPHONE (OUTPATIENT)
Dept: CARDIOLOGY | Facility: CLINIC | Age: 89
End: 2024-10-23
Payer: COMMERCIAL

## 2024-10-23 NOTE — TELEPHONE ENCOUNTER
Patient was admitted to [facility] on 10/19/24 with progressive LERMA from [place], [place] ED and was found to be in new atrial flutter with RVR (HR elevated to 140's ) and elevated BP. Rates and BP well controlled on Diltiazem gtt.    PMH: NICM, nonobstructive CAD, HLP, HTN and frequent PAC's.     10/21/24: Echo showed EF of 55-60%. No thrombus is detected in the left atrial appendage. Mild mitral valve prolapse, bileaflet. There is moderate (2+) mitral regurgitation. left atrium is mild to moderately dilated.     10/21/24: EPS followed with A. Flutter Ablation via RFV resulted in successful catheter ablation of cavotricuspid isthmus flutter.     Pt was started on Eliqius. PTA ASA and Losartan were discontinued at time of discharge. 14 day Zio Patch monitor mailed out.    Called patient to discuss any post hospital d/c questions he may have, review medication changes, and confirm f/u appts. Patient denied any questions regarding new medications or changes with their PTA medications.    Patient denied any SOB, chest pain, palpitations or lightheadedness.     RFV cardiac cath site is without bleeding, swelling, redness or signs of infection.     RN confirmed with patient that he is scheduled for an OV on 11/19/24 at 1130 with RADAMES Rima Kevin at our [place] [facility].    Patient advised to call clinic with any cardiac related questions or concerns prior to this radames't. Patient verbalized understanding and agreed with plan. RACHNA Hahn RN.

## 2024-10-26 NOTE — DISCHARGE SUMMARY
"[facility]  Hospitalist Discharge Summary      Date of Admission:  10/19/2024  Date of Discharge:  10/22/2024  6:54 PM  Discharging Provider: ZAKIA JIMENEZ MD  Discharge Service: Hospitalist Service    Discharge Diagnoses   New onset atrial flutter  Atrial flutter ablation   Nonischemic cardiomyopathy   Hypertension   Incidental 4 mm RUL pulmonary nodule: follow up primary   History of recurrent hives.   Acute kidney injury on chronic kidney injury   Hyperlipidemia  Zio patch to be mailed     Clinically Significant Risk Factors     # Overweight: Estimated body mass index is 27.33 kg/m  as calculated from the following:    Height as of this encounter: 1.676 m (5' 6\").    Weight as of this encounter: 76.8 kg (169 lb 4.8 oz).       Follow-ups Needed After Discharge   Follow-up Appointments     Follow-up and recommended labs and tests       -------------------------------------  FOLLOW UP WITH DIANA SUTHERLAND ON TUESDAY, OCTOBER 29TH AT 1:20 PM AT:     [facility] [address] [place] [address]   [phone]        Follow-up and recommended labs and tests       Follow up with primary care provider, Physician Chioma Ref-Primary, within 7   days for hospital follow- up.  The following labs/tests are recommended:   Basic metabolic profile and cbc        Follow up primary for pulmonary nodule     Unresulted Labs Ordered in the Past 30 Days of this Admission       No orders found from 9/19/2024 to 10/20/2024.        These results will be followed up by primary     Discharge Disposition   Discharged to home  Condition at discharge: Stable    Hospital Course   Tom Summers is a 89 year old male with a history of nonischemic cardiomyopathy, nonobstructive coronary artery disease, hypertension, skin cancer, retinal vein occlusion who presents with progressive worsening dyspnea on exertion and was found to be in new onset atrial flutter.       Patient initially presented to [facility] emergency room with complaints of high blood pressure and heart rate.  Found to have heart rate in the 140s for about 5 days.  Given 5 mg IV metoprolol, Toprol XL 25 mg.  Discussed with cardiology at [facility] and transferred.  .  Creatinine 1.33.  Chest x-ray negative.  Placed on IV heparin drip     New onset atrial flutter  Atrial flutter ablaion   Nonischemic cardiomyopathy  Hypertension  Hyperlipidemia  Presentation to [facility] emergency room on 10/19 found to be in atrial flutter with a heart rate of 140    Previously seen by cardiology 4/2024 Dr. Ashraf evaluation for shortness of breath   -Echo 4/17/2024 showed EF 52% with no significant valvular disease.  -Coronary angiogram on 4/18/2024 showed no significant coronary artery disease.  Had 50% proximal to mid LAD and 50% first diagonal lesion stenosis.  -Cardiac MRI was negative for ischemia with EF 68%, no MI, scar or infiltrative disease, right ventricular EF 52%, ELANA  -Zio patch showed sinus rhythm with frequent PACs and PVCs.  Pulmonary evaluation including PFTs and chest imaging were normal.  CT PE study showing no evidence of pulmonary embolus.  Minimal peripheral fibrosis lung bases.  Incidental 4 mm right upper lobe pulmonary nodule.   - Patient was started on losartan and uptitrated to 100 mg daily for management of blood pressure.    This admission started on heparin drip prior to transfer to [facility]. Then started on eliquis. Cardiology consulted. Seen in consultation by EP cardiology. Patient had study with ablation of the atrial flutter. Continued on eliquis 5 mg po BID. ASA stopped. Troponin 31 >>29 flat with negative recent ischemic evaluation suspected supply/demand. Continued on cardiac monitor. Initially started on diltiazem drip during admit which was stopped. On discharge resumed on toprol XL 25 mg at discharge. Losartan held with increase in creatinine.  Atorvastatin 40 mg daily resumed. Patient to monitor BP at home.  If SBP > 140-150 contact primary to re evaluate BP meds      History of recurrent hives-  patient is on levocetirizine and is very insistent that he needs to take this every day. Continued      #Acute kidney injury-  Creatinine elevated at 1.27. Creatinine increased to 1.39 and given fluids with creatinine returned to 1.28  (prior creatinine 6/2023 1.28). Holding losartan on discharge. Recommended BMP in clinic 2 days after discharge with orders on discharge but in [facility] area      4 mm RUL pulmonary nodule  follow up with primary   discussed with patient      Consultations This Hospital Stay   PHARMACY IP CONSULT  CARDIOLOGY IP CONSULT  CARDIOLOGY IP CONSULT  ADVANCE DIRECTIVE IP CONSULT  PHARMACY LIAISON FOR MEDICATION COVERAGE CONSULT    Code Status   Prior    Time Spent on this Encounter   I, ZAKIA JIMENEZ MD, personally saw the patient today and spent greater than 30 minutes discharging this patient.       ZAKIA JIMENEZ MD  [facility]  [address] [place] [address], [address] [address]  Phone: [phone]  ______________________________________________________________________    Physical Exam   Vital Signs:                    Weight: 169 lbs 4.8 oz  General Appearance: Patient awake and alert   Respiratory: Clear to auscultation with no wheezes or rhonchi  Cardiovascular: Regular rate with no gallop or rub  GI: + BS, soft, non tender  Skin: No edema or redness         Primary Care Physician   Physician No Ref-Primary    Discharge Orders      Basic metabolic panel     Primary Care Referral      Follow-up and recommended labs and tests     -------------------------------------  FOLLOW UP WITH DIANA SUTHERLAND ON TUESDAY, OCTOBER 29TH AT 1:20 PM AT:     [facility] [address] [place] [address]   [phone]     Brief Discharge Instructions    PCP follow up appointment arranged as below:     FOLLOW UP WITH DIANA SUTHERLAND ON TUESDAY, OCTOBER 29TH AT 1:20  PM AT:      [facility]   [address] [place] [address]   [phone]     Reason for your hospital stay    HR elevated and ablation     Follow-up and recommended labs and tests     Follow up with primary care provider, Physician No Ref-Primary, within 7 days for hospital follow- up.  The following labs/tests are recommended: Basic metabolic profile and cbc     Activity    Your activity upon discharge: activity as tolerated: follow cardiology restrictions     Brief Discharge Instructions    Get a lab check on Thursday in your clinic. Follow your BP and if top number > 140-150 update your doctor to determine if need for more meds or restart of your losartan     Brief Discharge Instructions    Stop your ASA not that you are on eliquis     Diet    Follow this diet upon discharge: Current Diet:Orders Placed This Encounter      Regular Diet Adult     Zio Patch Mail Out    Please make sure billing insurance company and not patient directly.       Discharge Follow-up Details      Primary Care Referral      Reason for Referral: Follow-up with existing PCP    Additional Information: Please schedule visit in 5 weeks to review Zio patch results.            Significant Results and Procedures   Most Recent 3 CBC's:  Recent Labs   Lab Test 10/20/24  0316 10/19/24  1900 06/27/24  0810 04/18/24  0405   WBC 7.6 7.9  --  6.3   HGB 12.4* 13.8 11.6 11.9*   MCV 91 93  --  93    201  --  192     Most Recent 3 BMP's:  Recent Labs   Lab Test 10/22/24  1610 10/22/24  1043 10/20/24  0316    142 139   POTASSIUM 4.6 4.3 4.1   CHLORIDE 106 107 105   CO2 27 27 27   BUN 27.1* 28.4* 17.9   CR 1.27* 1.39* 1.11   ANIONGAP 7 8 7   GABBY 8.8 9.5 8.8   * 96 99     Most Recent 2 LFT's:  Recent Labs   Lab Test 08/01/24  1447 04/17/24  0847   AST  --  23   ALT 19 12   ALKPHOS  --  69   BILITOTAL  --  0.4     Most Recent 3 INR's:No lab results found.  Most Recent INR's and Anticoagulation Dosing  History:  Anticoagulation Dose History           No data to display              Most Recent 3 Creatinines:  Recent Labs   Lab Test 10/22/24  1610 10/22/24  1043 10/20/24  0316   CR 1.27* 1.39* 1.11     Most Recent 3 Hemoglobins:  Recent Labs   Lab Test 10/20/24  0316 10/19/24  1900 06/27/24  0810   HGB 12.4* 13.8 11.6     Most Recent 3 Troponin's:No lab results found.  Most Recent 3 BNP's:  Recent Labs   Lab Test 04/17/24  0847   NTBNP 1,576     Most Recent D-dimer:  Recent Labs   Lab Test 04/17/24  0847   DD 1.56*     Most Recent Cholesterol Panel:  Recent Labs   Lab Test 08/01/24  1447   CHOL 142   LDL 41   HDL 61   TRIG 200*     7-Day Micro Results       No results found for the last 168 hours.          Most Recent TSH and T4:No lab results found.  Most Recent Hemoglobin A1c:  Recent Labs   Lab Test 04/17/24  2225   A1C 5.6     Most Recent 6 glucoses:  Recent Labs   Lab Test 10/22/24  1610 10/22/24  1043 10/20/24  0316 10/15/24  1431 08/09/24  1411 04/18/24  0405   * 96 99 100* 101* 103*     Most Recent Urinalysis:  Recent Labs   Lab Test 10/19/24  1942   COLOR Light Yellow   APPEARANCE Clear   URINEGLC Negative   URINEBILI Negative   URINEKETONE Negative   SG 1.019   UBLD Negative   URINEPH 5.5   PROTEIN Negative   NITRITE Negative   LEUKEST Negative   RBCU 1   WBCU <1     Most Recent ABG:No lab results found.  Most Recent ESR & CRP:No lab results found.  Most Recent Anemia Panel:  Recent Labs   Lab Test 10/20/24  0316   WBC 7.6   HGB 12.4*   HCT 37.1*   MCV 91        Most Recent CPK:No lab results found.,   Results for orders placed or performed during the hospital encounter of 10/19/24   EP Ablation    Narrative    PROCEDURES PERFORMED:  1.  Comprehensive electrophysiology study.  2.  Left atrial recording and pacing via the coronary sinus.  3.  3D cardiac mapping.  4.  Catheter ablation of atrial flutter.   5.  Cardiac fluoroscopy (4 min, radiation dose 27 mGy).  6.  Conscious sedation,  "moderate level.        CLINICAL HISTORY:  89-year-old male with NICM and frequent PACs who presented with atrial   flutter with RVR. The arrhythmia has been difficult to rate control   pharmacologically. PATRICE-guided catheter ablation was recommended. The   patient has expressed understanding and agrees to proceed. A PATRICE earlier   today showed no atrial thrombus. He has already been placed on apixaban.      PROCEDURE:   The patient was brought to the Cardiac Electrophysiology Laboratory at   [facility] on 10/21/2024.  The patient was in the   fasting, nonsedated state.  Informed consent had been obtained.  The   patient was placed on the procedure table and the groin areas were prepped   and draped in the usual sterile fashion.  We continuously monitored vital   signs, oxygenation and level of sedation.  I was present during the entire   time that conscious sedation was provided.  Under 1% lidocaine for local   anesthesia and the use of ultrasound and a micropuncture needle, 3 sheaths   were introduced in the right femoral vein.      Under fluoroscopic guidance, the following catheters were introduced:   a. A steerable decapolar catheter was brought in the coronary sinus.  b. A steerable 20-pole [employer] DX-20 catheter was brought in the   right atrium, along the tricuspid annulus.    c. For 3D mapping and ablation we used a [employer] Oyg SR0 sheath and a [employer]   ST/SF catheter with F/J curve and 3.5 mm irrigated tip.    Measurements and pacing protocols were performed as outlined in the   \"RESULTS\" section of this report.  The patient was in atrial flutter with   RVR.  Once the mechanism of tachycardia was confirmed we proceeded with   catheter ablation.  See details below.  The procedure was successful.      Following ablation, pacing protocols were performed to re-induce the   arrhythmia.  The arrhythmia was not inducible.  Bidirectional conduction   block persisted across the isthmus.  Thirty " minutes after the final   ablation attempt all catheters and sheaths were removed and hemostasis was   obtained with manual pressure.  There were no apparent complications.  The   patient was brought back to the hospital room in stable condition.      RESULTS:  (I) ELECTROCARDIOGRAM: typical atrial flutter with RVR.    (II) BASIC INTERVALS IN THE BASELINE STATE (measured in msec):  RR variable    NH (after ablation) 175      HV 48    (III) ANTEGRADE CONDUCTION PROPERTIES:  Assessed following atrial flutter ablation. There was 1:1 AV conduction   during atrial pacing at 410 ms with AV Wenckebach occurring at 390 ms.    Following pacing drive at 320 ms the atrial ERP was 230 ms.    (IV) RETROGRADE CONDUCTION PROPERTIES:   During ventricular pacing, there was 1:1 VA conduction at 590 ms with VA   Wenckebach occurring at 570 ms.    (V) ARRHYTHMIAS SEEN OR INDUCED:  a.  Typical counterclockwise cavotricuspid isthmus (CTI) flutter.  This   was the presenting arrhythmia in the EP lab. The atrial cycle length was   215 ms.  Coronary sinus catheter activation was from proximal to distal   and halo catheter activation was counterclockwise on the tricuspid anulus.    During 3D mapping the entire cycle length of the arrhythmia was   encountered in the right atrium.  The arrhythmia terminated during   ablation in the cavotricuspid isthmus.    (VI) MAPPING AND ABLATION:  Following confirmation of typical atrial flutter using 3D mapping, RF   ablation was performed using the above mentioned irrigated [employer]   catheter, point by point, starting from the tricuspid annulus gradually   toward the IVC. There was a possible pouch in the middle of the isthmus.   The isthmus area was long ~5 cm.  Atrial flutter terminated during   ablation in the CTI.  Ultimately, bidirectional conduction block was   achieved. RF ablation time was ~19 minutes.    (VII) POST-ABLATION TESTING:  Following complete CTI ablation the  transisthmus time was ~170-180 ms in   both directions.  Differential pacing confirmed bidirectional CTI block.    There was no other inducible arrhythmia.      DISCUSSION:   Successful typical atrial flutter ablation with arrhythmia termination and   bidirectional block.  Anticoagulation should be continued for at least one   month.      CONCLUSIONS:  1.  Successful catheter ablation of cavotricuspid isthmus flutter.   2.  No apparent in-lab complications.          Transesophageal Echocardiogram     Value    LVEF  55-60%    [facility]    [id number]  [id number]  [id number]^[email or web address]^ADALID^LIONEL     [facility]  Echocardiography Laboratory  [address] [place] [id number]     Name: SAMAN GENTILE  MRN: [id number]  : 1935  Study Date: 10/21/2024 01:03 PM  Age: 89 yrs  Gender: Male  Patient Location: [facility]  Reason For Study: A Flutter  Ordering Physician: ADALID PIKE  Performed By: Pratima Yen     BSA: 1.9 m2  Height: 66 in  Weight: 169 lb  HR: 139  BP: 130/84 mmHg  ______________________________________________________________________________  Procedure  Complete PATRICE Adult. PARTICE Probe serial #[id number] was used during the procedure.  The heart rate, respiratory rate and response to care were monitored  throughout the procedure with the assistance of the nurse.  ______________________________________________________________________________  Interpretation Summary     Left ventricular systolic function is normal.  The visual ejection fraction is 55-60%.  No thrombus is detected in the left atrial appendage.  Mild mitral valve prolapse, bileaflet.  There is moderate (2+) mitral regurgitation.  The rhythm was atrial flutter.  The study was technically adequate.  ______________________________________________________________________________  PATRICE  Versed (2.5mg) was given intravenously. Fentanyl (50mcg) was given  intravenously. I determined this patient to be an appropriate candidate for  the  planned sedation and procedure and have reassessed the patient immediately  prior to sedation and procedure. Total sedation time: 20 minutes minutes of  continuous bedside 1:1 monitoring. The transesophageal probe was passed  without difficulty. There were no complications associated with this  procedure.     Left Ventricle  The left ventricle is normal in size. Left ventricular systolic function is  normal. The visual ejection fraction is 55-60%. No regional wall motion  abnormalities noted. Neoga was foreshortened.     Right Ventricle  The right ventricle is normal size. The right ventricular systolic function is  normal.     Atria  The left atrium is mild to moderately dilated. The right atrium is mildly  dilated. There is no color Doppler evidence of an atrial shunt. A contrast  injection (Bubble Study) was performed that was negative for flow across the  interatrial septum. No thrombus is detected in the left atrial appendage.     Mitral Valve  The mitral valve leaflets are mildly thickened. Mild mitral valve prolapse,  bileaflet. There is moderate (2+) mitral regurgitation. (Multiple jets seen.  No evidence of systolic reversal on doppler of pulmonary veins).     Tricuspid Valve  There is mild to moderate (1-2+) tricuspid regurgitation. Right ventricular  systolic pressure could not be approximated due to inadequate tricuspid  regurgitation.     Aortic Valve  There is mild trileaflet aortic sclerosis. No aortic stenosis is present.     Pulmonic Valve  The pulmonic valve is not well visualized. There is trace to mild pulmonic  valvular regurgitation.     Vessels  The aortic root is normal size. Mild atherosclerotic plaque(s) in the  descending aorta.     Pericardial/Pleural  There is no pericardial effusion.     Rhythm  The rhythm was atrial flutter.  ______________________________________________________________________________  Doppler Measurements & Calculations  MR PISA: 3.6 cm2  MR ERO: 0.21 cm2  MR volume:  19.2 ml     ______________________________________________________________________________  Report approved by: Alta Gimenez 10/21/2024 01:55 PM             Discharge Medications   Discharge Medication List as of 10/22/2024  6:31 PM        START taking these medications    Details   apixaban ANTICOAGULANT (ELIQUIS) 5 MG tablet Take 1 tablet (5 mg) by mouth 2 times daily. DO not run out of this medication. You will need to confirm with cardiology the plan after 30 days for your eliquis. You will need to get refills from your cardiology., Disp-60 tablet, R-0, E-Prescribe           CONTINUE these medications which have NOT CHANGED    Details   Ascorbic Acid (VITAMIN C PO) Take 500 mg by mouth as needed (when feeling sick/has a cold) Dose unclear. Possibly 500-1000 mg or 2-3 tabs, Historical      atorvastatin (LIPITOR) 40 MG tablet Take 1 tablet (40 mg) by mouth daily, Disp-30 tablet, R-11, E-Prescribe      levocetirizine (XYZAL) 5 MG tablet Take 5 mg by mouth daily, Historical      metoprolol succinate ER (TOPROL XL) 25 MG 24 hr tablet Take 1 tablet (25 mg) by mouth daily, Disp-30 tablet, R-11, E-Prescribe           STOP taking these medications       aspirin (ASA) 81 MG chewable tablet Comments:   Reason for Stopping:         losartan (COZAAR) 100 MG tablet Comments:   Reason for Stopping:         NONFORMULARY Comments:   Reason for Stopping:         VITAMIN D PO Comments:   Reason for Stopping:             Allergies   No Known Allergies

## 2024-11-14 LAB — CV ZIO PRELIM RESULTS: NORMAL
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
## 2024-11-19 ENCOUNTER — OFFICE VISIT (OUTPATIENT)
Dept: CARDIOLOGY | Facility: CLINIC | Age: 89
End: 2024-11-19
Payer: COMMERCIAL

## 2024-11-19 VITALS
OXYGEN SATURATION: 98 % | HEART RATE: 72 BPM | DIASTOLIC BLOOD PRESSURE: 100 MMHG | HEIGHT: 66 IN | BODY MASS INDEX: 27.74 KG/M2 | SYSTOLIC BLOOD PRESSURE: 177 MMHG | WEIGHT: 172.6 LBS | RESPIRATION RATE: 16 BRPM

## 2024-11-19 DIAGNOSIS — I48.19 PERSISTENT ATRIAL FIBRILLATION (H): ICD-10-CM

## 2024-11-19 DIAGNOSIS — I10 BENIGN ESSENTIAL HYPERTENSION: ICD-10-CM

## 2024-11-19 DIAGNOSIS — I48.3 TYPICAL ATRIAL FLUTTER (H): Primary | ICD-10-CM

## 2024-11-19 DIAGNOSIS — R06.02 SHORTNESS OF BREATH: ICD-10-CM

## 2024-11-19 RX ORDER — METOPROLOL SUCCINATE 50 MG/1
50 TABLET, EXTENDED RELEASE ORAL DAILY
COMMUNITY
Start: 2024-11-19

## 2024-11-19 RX ORDER — FUROSEMIDE 20 MG/1
TABLET ORAL
Qty: 12 TABLET | Refills: 0 | Status: SHIPPED | OUTPATIENT
Start: 2024-11-19 | End: 2024-11-29

## 2024-11-19 NOTE — PATIENT INSTRUCTIONS
"Tom - it was nice to  see you today!     At your visit today we reviewed:    Atrial flutter ablation went well!   Follow-up monitor showed NO AFlutter and no \"cousin\" AFib.  You did have some fast rhythms from top and bottom of heart (SVT, VT). Monitor also showed early beats (PACs, PVCs) when you pushed the button for shortness of breath  Reviewed increase in Metoprolol didn't help anything        Medication Changes:    OK to stop Eliquis 1 month after ablation (2024)    TRY a short course of 'water pill' to see if breathing gets any better. Furosemide (Lasix) 20 mg  Take 2 tabs (40 mg) in AM  and   Decrease 1 tab (20 mg) in AM starting         Recommendations:    Pls call [phone] or send [employer] in ~ 1 week () with update on:  Breathing - any better on new medication?  BP  Weight on  and again  when you call  Any issues on new medications    Follow-up:    See us for cardiology follow up in TBD but CALL Cardiology nurses Mayelin & Claire @ [phone] for any issues, questions or concerns in the interim.      To schedule a future appointment, we kindly ask that you call cardiology scheduling at [phone] three months prior to requested visit date.    Important Phone Numbers for [facility] ([place]):    [place] [facility] Mayelin [facility] Claire: [phone]  Cardiology [phone]  [place] [facility] [phone]  [place] [facility] [phone]  [place] [facility]: [phone]      "
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
## 2024-11-26 ENCOUNTER — TELEPHONE (OUTPATIENT)
Dept: CARDIOLOGY | Facility: CLINIC | Age: 89
End: 2024-11-26
Payer: COMMERCIAL

## 2024-11-26 NOTE — TELEPHONE ENCOUNTER
Thanks for update - I'm glad he stopped. Pls update EPIC med list.  He was to see Malathi in Dec for BP. If BPs are in 130s, OK to move it out Spring 2024. No EP follow-up required.    Thx!  Rima  November 26, 2024 at 11:45 AM

## 2024-11-26 NOTE — TELEPHONE ENCOUNTER
Routing call to Rima Kevin, PAC -     Per last note-   Plan:  1. Trial of Lasix 20 mg  Take 2 tabs (40 mg) in AM 11/20 and 11/21  Decrease 1 tab (20 mg) in AM starting 11/22  2. He'll call or send MyChart in 1 week with update on  Breathing - any better on new medication?  BP  Weight on Wed 11/20 and again Tuesday 11/26 when you call  Any issues on new medications     3. If no change in breathing or c/o side effects, will stop  4. If improved breathing or feels better on it, will get updated BMP at [place]  5. For now, will keep Malathi appt for BP 12/12    Pt called back with update. He took 2 lasix tabs on 11/20 and 11/21 then decreased to 1 tab a day for two more days and then stopped.   He states he felt terrible when taking them. Woozy feeling and very fatigued (no energy).    While taking lasix BP lower with systolic 108-110 (woozy feeling). Now back up to his normal 130's systolic and feels much better.   Stated breathing was slightly better with lasix, but not much  No change in wt. Wt remained the same with or without lasix.   Pt wanting to remain off Lasix, but wondering if he needs to follow up with Malathi so soon on 12/12 or can this be pushed out further?     Mayelin Small, RN

## 2024-11-26 NOTE — TELEPHONE ENCOUNTER
Med list updated.     Routing to scheduling to assist pt in cancelling visit with Malathi Lipscomb CNP on 12/19/24 and reschedule to spring 2025.    Mayelin Small RN

## 2025-01-20 ENCOUNTER — TELEPHONE (OUTPATIENT)
Dept: CARDIOLOGY | Facility: CLINIC | Age: 89
End: 2025-01-20
Payer: COMMERCIAL

## 2025-01-20 NOTE — TELEPHONE ENCOUNTER
Left Voicemail (1st Attempt) and Sent Mychart (1st Attempt) for the patient to call back and schedule the following:    Appointment type: RTN CARDIO  Provider: PEREZ  Return date: 5/14/2025 (NEXT AVAILABLE)  Specialty phone number: [phone] OPT 1  Additional appointment(s) needed: N/A  Additonal Notes: PT WANTING TO SEE DR. TODD INSTEAD OF DR. GUZMAN. PLEASE RESCHEDULE RTN CARDIOLOGY WITH PEREZ FOR [facility] FIRST AVAILABLE. OKAY TO USE THE 7:00AM SLOTS FOR RETURN PTS PER ALEX AGUIAR RN.
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
## 2025-02-05 ENCOUNTER — OFFICE VISIT (OUTPATIENT)
Dept: CARDIOLOGY | Facility: CLINIC | Age: 89
End: 2025-02-05
Attending: NURSE PRACTITIONER
Payer: COMMERCIAL

## 2025-02-05 VITALS
OXYGEN SATURATION: 99 % | SYSTOLIC BLOOD PRESSURE: 187 MMHG | BODY MASS INDEX: 27.44 KG/M2 | WEIGHT: 170 LBS | DIASTOLIC BLOOD PRESSURE: 99 MMHG | HEART RATE: 70 BPM

## 2025-02-05 DIAGNOSIS — I25.10 NONOBSTRUCTIVE ATHEROSCLEROSIS OF CORONARY ARTERY: ICD-10-CM

## 2025-02-05 DIAGNOSIS — Z86.79 HISTORY OF CARDIOMYOPATHY: ICD-10-CM

## 2025-02-05 DIAGNOSIS — I10 BENIGN ESSENTIAL HYPERTENSION: ICD-10-CM

## 2025-02-05 DIAGNOSIS — I50.30 HEART FAILURE WITH PRESERVED EJECTION FRACTION, NYHA CLASS I (H): Primary | ICD-10-CM

## 2025-02-05 DIAGNOSIS — E78.5 HYPERLIPIDEMIA LDL GOAL <70: ICD-10-CM

## 2025-02-05 PROCEDURE — G0463 HOSPITAL OUTPT CLINIC VISIT: HCPCS | Performed by: INTERNAL MEDICINE

## 2025-02-05 RX ORDER — DILTIAZEM HYDROCHLORIDE 120 MG/1
120 CAPSULE, EXTENDED RELEASE ORAL DAILY
Qty: 90 CAPSULE | Refills: 11 | Status: SHIPPED | OUTPATIENT
Start: 2025-02-05

## 2025-02-05 RX ORDER — CHLORTHALIDONE 25 MG/1
25 TABLET ORAL DAILY
Qty: 90 TABLET | Refills: 11 | Status: SHIPPED | OUTPATIENT
Start: 2025-02-05

## 2025-02-05 ASSESSMENT — PAIN SCALES - GENERAL: PAINLEVEL_OUTOF10: NO PAIN (0)
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
## 2025-02-05 NOTE — NURSING NOTE
Chief Complaint   Patient presents with    Follow Up     RETURN CARDIOLOGY     Vitals were taken and medications reconciled.    Bradley Waggoner, EMT  9:39 AM

## 2025-02-05 NOTE — PATIENT INSTRUCTIONS
Dr. Hyatt recommends:    Stop taking Toprol XL/Metoprolol Succinate.    Start taking Chlorthalidone 25 MG once daily.    Start taking Diltiazem  MG once daily.    Please send an update on your blood pressures in a week or two. You can also call [phone].     Follow up clinic visit with Dr. Hyatt on 4/30/25 at 9:30 with labs prior.    Thank you for your visit today.  Please call me with any questions or concerns.   Lucien Mesa RN  Cardiology Care Coordinator  [phone]

## 2025-03-30 ENCOUNTER — APPOINTMENT (OUTPATIENT)
Dept: GENERAL RADIOLOGY | Facility: CLINIC | Age: 89
End: 2025-03-30
Attending: EMERGENCY MEDICINE
Payer: COMMERCIAL

## 2025-03-30 ENCOUNTER — APPOINTMENT (OUTPATIENT)
Dept: CT IMAGING | Facility: CLINIC | Age: 89
End: 2025-03-30
Attending: STUDENT IN AN ORGANIZED HEALTH CARE EDUCATION/TRAINING PROGRAM
Payer: COMMERCIAL

## 2025-03-30 ENCOUNTER — HOSPITAL ENCOUNTER (OUTPATIENT)
Facility: CLINIC | Age: 89
Setting detail: OBSERVATION
Discharge: HOME | End: 2025-03-31
Attending: EMERGENCY MEDICINE | Admitting: INTERNAL MEDICINE
Payer: COMMERCIAL

## 2025-03-30 ENCOUNTER — MYC MEDICAL ADVICE (OUTPATIENT)
Dept: CARDIOLOGY | Facility: CLINIC | Age: 89
End: 2025-03-30

## 2025-03-30 DIAGNOSIS — I50.32 CHRONIC HEART FAILURE WITH PRESERVED EJECTION FRACTION (H): ICD-10-CM

## 2025-03-30 DIAGNOSIS — I48.0 PAROXYSMAL ATRIAL FIBRILLATION (H): ICD-10-CM

## 2025-03-30 DIAGNOSIS — H53.8 BLURRED VISION: ICD-10-CM

## 2025-03-30 DIAGNOSIS — R29.898 LIMB WEAKNESS: ICD-10-CM

## 2025-03-30 DIAGNOSIS — R20.0 NUMBNESS: ICD-10-CM

## 2025-03-30 DIAGNOSIS — I10 BENIGN ESSENTIAL HYPERTENSION: ICD-10-CM

## 2025-03-30 DIAGNOSIS — R06.02 SHORTNESS OF BREATH: ICD-10-CM

## 2025-03-30 DIAGNOSIS — R42 DIZZINESS: ICD-10-CM

## 2025-03-30 DIAGNOSIS — R51.9 NONINTRACTABLE HEADACHE, UNSPECIFIED CHRONICITY PATTERN, UNSPECIFIED HEADACHE TYPE: Primary | ICD-10-CM

## 2025-03-30 DIAGNOSIS — R06.09 EXERTIONAL DYSPNEA: ICD-10-CM

## 2025-03-30 DIAGNOSIS — R00.0 TACHYCARDIA, UNSPECIFIED: ICD-10-CM

## 2025-03-30 LAB
ALBUMIN SERPL BCG-MCNC: 4.2 G/DL (ref 3.5–5.2)
ALP SERPL-CCNC: 81 U/L (ref 40–150)
ALT SERPL W P-5'-P-CCNC: 20 U/L (ref 0–70)
ANION GAP SERPL CALCULATED.3IONS-SCNC: 14 MMOL/L (ref 7–15)
APTT PPP: 29 SECONDS (ref 22–38)
AST SERPL W P-5'-P-CCNC: 30 U/L (ref 0–45)
BASOPHILS # BLD AUTO: 0 10E3/UL (ref 0–0.2)
BASOPHILS NFR BLD AUTO: 0 %
BILIRUB SERPL-MCNC: 0.4 MG/DL
BUN SERPL-MCNC: 39.9 MG/DL (ref 8–23)
CALCIUM SERPL-MCNC: 10.1 MG/DL (ref 8.8–10.4)
CHLORIDE SERPL-SCNC: 100 MMOL/L (ref 98–107)
CREAT SERPL-MCNC: 1.5 MG/DL (ref 0.67–1.17)
EGFRCR SERPLBLD CKD-EPI 2021: 45 ML/MIN/1.73M2
EOSINOPHIL # BLD AUTO: 0.1 10E3/UL (ref 0–0.7)
EOSINOPHIL NFR BLD AUTO: 1 %
ERYTHROCYTE [DISTWIDTH] IN BLOOD BY AUTOMATED COUNT: 13.7 % (ref 10–15)
EST. AVERAGE GLUCOSE BLD GHB EST-MCNC: 140 MG/DL
GLUCOSE SERPL-MCNC: 122 MG/DL (ref 70–99)
HBA1C MFR BLD: 6.5 %
HCO3 SERPL-SCNC: 25 MMOL/L (ref 22–29)
HCT VFR BLD AUTO: 43.8 % (ref 40–53)
HGB BLD-MCNC: 14.5 G/DL (ref 13.3–17.7)
IMM GRANULOCYTES # BLD: 0.1 10E3/UL
IMM GRANULOCYTES NFR BLD: 1 %
INR PPP: 0.92 (ref 0.85–1.15)
LYMPHOCYTES # BLD AUTO: 1.4 10E3/UL (ref 0.8–5.3)
LYMPHOCYTES NFR BLD AUTO: 13 %
MAGNESIUM SERPL-MCNC: 2 MG/DL (ref 1.7–2.3)
MCH RBC QN AUTO: 30.1 PG (ref 26.5–33)
MCHC RBC AUTO-ENTMCNC: 33.1 G/DL (ref 31.5–36.5)
MCV RBC AUTO: 91 FL (ref 78–100)
MONOCYTES # BLD AUTO: 0.9 10E3/UL (ref 0–1.3)
MONOCYTES NFR BLD AUTO: 8 %
NEUTROPHILS # BLD AUTO: 8.2 10E3/UL (ref 1.6–8.3)
NEUTROPHILS NFR BLD AUTO: 77 %
NRBC # BLD AUTO: 0 10E3/UL
NRBC BLD AUTO-RTO: 0 /100
NT-PROBNP SERPL-MCNC: 2447 PG/ML (ref 0–1800)
PLATELET # BLD AUTO: 238 10E3/UL (ref 150–450)
POTASSIUM SERPL-SCNC: 3.8 MMOL/L (ref 3.4–5.3)
PROT SERPL-MCNC: 8 G/DL (ref 6.4–8.3)
RBC # BLD AUTO: 4.82 10E6/UL (ref 4.4–5.9)
SODIUM SERPL-SCNC: 139 MMOL/L (ref 135–145)
TROPONIN T SERPL HS-MCNC: 23 NG/L
TROPONIN T SERPL HS-MCNC: 36 NG/L
WBC # BLD AUTO: 10.7 10E3/UL (ref 4–11)

## 2025-03-30 PROCEDURE — 93010 ELECTROCARDIOGRAM REPORT: CPT | Performed by: EMERGENCY MEDICINE

## 2025-03-30 PROCEDURE — 85730 THROMBOPLASTIN TIME PARTIAL: CPT | Performed by: EMERGENCY MEDICINE

## 2025-03-30 PROCEDURE — 83735 ASSAY OF MAGNESIUM: CPT | Performed by: STUDENT IN AN ORGANIZED HEALTH CARE EDUCATION/TRAINING PROGRAM

## 2025-03-30 PROCEDURE — 83880 ASSAY OF NATRIURETIC PEPTIDE: CPT | Performed by: EMERGENCY MEDICINE

## 2025-03-30 PROCEDURE — 36415 COLL VENOUS BLD VENIPUNCTURE: CPT | Performed by: EMERGENCY MEDICINE

## 2025-03-30 PROCEDURE — 99285 EMERGENCY DEPT VISIT HI MDM: CPT | Mod: 25 | Performed by: EMERGENCY MEDICINE

## 2025-03-30 PROCEDURE — 85025 COMPLETE CBC W/AUTO DIFF WBC: CPT | Performed by: EMERGENCY MEDICINE

## 2025-03-30 PROCEDURE — 93005 ELECTROCARDIOGRAM TRACING: CPT

## 2025-03-30 PROCEDURE — 83735 ASSAY OF MAGNESIUM: CPT | Performed by: EMERGENCY MEDICINE

## 2025-03-30 PROCEDURE — 250N000013 HC RX MED GY IP 250 OP 250 PS 637: Performed by: STUDENT IN AN ORGANIZED HEALTH CARE EDUCATION/TRAINING PROGRAM

## 2025-03-30 PROCEDURE — 71046 X-RAY EXAM CHEST 2 VIEWS: CPT | Mod: 26 | Performed by: RADIOLOGY

## 2025-03-30 PROCEDURE — 83036 HEMOGLOBIN GLYCOSYLATED A1C: CPT

## 2025-03-30 PROCEDURE — 99285 EMERGENCY DEPT VISIT HI MDM: CPT | Mod: 25

## 2025-03-30 PROCEDURE — 85610 PROTHROMBIN TIME: CPT | Performed by: EMERGENCY MEDICINE

## 2025-03-30 PROCEDURE — 70450 CT HEAD/BRAIN W/O DYE: CPT

## 2025-03-30 PROCEDURE — 84484 ASSAY OF TROPONIN QUANT: CPT | Performed by: EMERGENCY MEDICINE

## 2025-03-30 PROCEDURE — 99222 1ST HOSP IP/OBS MODERATE 55: CPT | Mod: 4UV | Performed by: INTERNAL MEDICINE

## 2025-03-30 PROCEDURE — 71046 X-RAY EXAM CHEST 2 VIEWS: CPT

## 2025-03-30 PROCEDURE — 120N000002 HC R&B MED SURG/OB UMMC

## 2025-03-30 PROCEDURE — 70450 CT HEAD/BRAIN W/O DYE: CPT | Mod: 26 | Performed by: STUDENT IN AN ORGANIZED HEALTH CARE EDUCATION/TRAINING PROGRAM

## 2025-03-30 PROCEDURE — 82435 ASSAY OF BLOOD CHLORIDE: CPT | Performed by: EMERGENCY MEDICINE

## 2025-03-30 PROCEDURE — 93005 ELECTROCARDIOGRAM TRACING: CPT | Performed by: EMERGENCY MEDICINE

## 2025-03-30 PROCEDURE — 80061 LIPID PANEL: CPT

## 2025-03-30 PROCEDURE — 82247 BILIRUBIN TOTAL: CPT | Performed by: EMERGENCY MEDICINE

## 2025-03-30 PROCEDURE — 99285 EMERGENCY DEPT VISIT HI MDM: CPT | Performed by: EMERGENCY MEDICINE

## 2025-03-30 RX ORDER — ATORVASTATIN CALCIUM 40 MG/1
40 TABLET, FILM COATED ORAL DAILY
Status: DISCONTINUED | OUTPATIENT
Start: 2025-03-31 | End: 2025-03-31 | Stop reason: HOSPADM

## 2025-03-30 RX ORDER — CALCIUM CARBONATE 500 MG/1
1000 TABLET, CHEWABLE ORAL 4 TIMES DAILY PRN
Status: DISCONTINUED | OUTPATIENT
Start: 2025-03-30 | End: 2025-03-31 | Stop reason: HOSPADM

## 2025-03-30 RX ORDER — BISACODYL 10 MG
10 SUPPOSITORY, RECTAL RECTAL DAILY PRN
Status: DISCONTINUED | OUTPATIENT
Start: 2025-03-30 | End: 2025-03-31 | Stop reason: HOSPADM

## 2025-03-30 RX ORDER — AMOXICILLIN 250 MG
2 CAPSULE ORAL 2 TIMES DAILY PRN
Status: DISCONTINUED | OUTPATIENT
Start: 2025-03-30 | End: 2025-03-31 | Stop reason: HOSPADM

## 2025-03-30 RX ORDER — POLYETHYLENE GLYCOL 3350 17 G/17G
17 POWDER, FOR SOLUTION ORAL DAILY
Status: DISCONTINUED | OUTPATIENT
Start: 2025-03-31 | End: 2025-03-31 | Stop reason: HOSPADM

## 2025-03-30 RX ORDER — CHLORTHALIDONE 25 MG/1
25 TABLET ORAL DAILY
Status: DISCONTINUED | OUTPATIENT
Start: 2025-03-31 | End: 2025-03-31 | Stop reason: HOSPADM

## 2025-03-30 RX ORDER — ACETAMINOPHEN 325 MG/1
650 TABLET ORAL EVERY 4 HOURS PRN
Status: DISCONTINUED | OUTPATIENT
Start: 2025-03-30 | End: 2025-03-31 | Stop reason: HOSPADM

## 2025-03-30 RX ORDER — DILTIAZEM HYDROCHLORIDE 120 MG/1
120 CAPSULE, COATED, EXTENDED RELEASE ORAL DAILY
Status: DISCONTINUED | OUTPATIENT
Start: 2025-03-31 | End: 2025-03-31 | Stop reason: HOSPADM

## 2025-03-30 RX ORDER — AMOXICILLIN 250 MG
1 CAPSULE ORAL 2 TIMES DAILY PRN
Status: DISCONTINUED | OUTPATIENT
Start: 2025-03-30 | End: 2025-03-31 | Stop reason: HOSPADM

## 2025-03-30 RX ORDER — LIDOCAINE 40 MG/G
CREAM TOPICAL
Status: DISCONTINUED | OUTPATIENT
Start: 2025-03-30 | End: 2025-03-31 | Stop reason: HOSPADM

## 2025-03-30 RX ORDER — NITROGLYCERIN 0.4 MG/1
0.4 TABLET SUBLINGUAL EVERY 5 MIN PRN
Status: DISCONTINUED | OUTPATIENT
Start: 2025-03-30 | End: 2025-03-31 | Stop reason: HOSPADM

## 2025-03-30 RX ADMIN — APIXABAN 2.5 MG: 2.5 TABLET, FILM COATED ORAL at 20:10

## 2025-03-30 ASSESSMENT — ACTIVITIES OF DAILY LIVING (ADL)
ADLS_ACUITY_SCORE: 59
ADLS_ACUITY_SCORE: 59
ADLS_ACUITY_SCORE: 61
ADLS_ACUITY_SCORE: 59
ADLS_ACUITY_SCORE: 61
ADLS_ACUITY_SCORE: 61
ADLS_ACUITY_SCORE: 59

## 2025-03-30 ASSESSMENT — COLUMBIA-SUICIDE SEVERITY RATING SCALE - C-SSRS
2. HAVE YOU ACTUALLY HAD ANY THOUGHTS OF KILLING YOURSELF IN THE PAST MONTH?: NO
6. HAVE YOU EVER DONE ANYTHING, STARTED TO DO ANYTHING, OR PREPARED TO DO ANYTHING TO END YOUR LIFE?: NO
1. IN THE PAST MONTH, HAVE YOU WISHED YOU WERE DEAD OR WISHED YOU COULD GO TO SLEEP AND NOT WAKE UP?: NO
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
## 2025-03-30 NOTE — ED TRIAGE NOTES
"Triage Assessment & Note:    /82   Pulse (!) 125   Temp 97.6  F (36.4  C) (Oral)   Resp 16   Ht 1.676 m (5' 6\")   Wt 77.1 kg (170 lb)   SpO2 98%   BMI 27.44 kg/m      Patient presents with: PT c/o dizziness and low BP. PT reports difficulty forming a sentence and blurred vision in the right eye around 0900 this AM. Currently no BEFAST s/s.     Home Treatments/Remedies: None    Febrile / Afebrile? Afebrile     Duration of C/o:  5 hours    Doug Emery RN  March 30, 2025       Triage Assessment (Adult)       Row Name 03/30/25 1412          Triage Assessment    Airway WDL WDL        Respiratory WDL    Respiratory WDL WDL        Cardiac WDL    Cardiac WDL WDL                     "

## 2025-03-30 NOTE — ED PROVIDER NOTES
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 enlargement is present.  Mild right atrial enlargement is present.  Pulmonary artery systolic pressure is normal.     This study was compared with the study from 10/21/24. Mitral regurgitation has  improved.  ______________________________________________________________________________  Left Ventricle  Global and regional left ventricular function is normal with an EF of 55-60%.  Left ventricular diastolic function is abnormal.     Right Ventricle  Right ventricular function, chamber size, wall motion, and thickness are  normal. Global right ventricular function is borderline reduced.     Atria  Moderate left atrial enlargement is present. Mild right atrial enlargement is  present.     Mitral Valve  The mitral valve is normal. Mild mitral annular calcification is present.  Trace mitral insufficiency is present.     Aortic Valve  Aortic valve is normal in structure and function. Mild aortic valve  calcification is present.     Tricuspid Valve  The tricuspid valve is normal. Mild tricuspid insufficiency is  present.  Estimated pulmonary artery systolic pressure is 30 mmHg plus right atrial  pressure. Pulmonary artery systolic pressure is normal.     Pulmonic Valve  The pulmonic valve is normal.     Vessels  The aorta root is normal. The thoracic aorta is normal. The pulmonary artery  cannot be assessed. The inferior vena cava was normal in size with preserved  respiratory variability.     Pericardium  No pericardial effusion is present.     Miscellaneous  No significant valvular abnormalities present.     Compared to Previous Study  This study was compared with the study from 10/21/24 . Mitral regurgitation  has improved.  ______________________________________________________________________________  MMode/2D Measurements & Calculations  Ao root diam: 3.5 cm  asc Aorta Diam: 3.3 cm  LVOT diam: 2.5 cm  LVOT area: 4.9 cm2  Ao root diam index Ht(cm/m): 2.1  Ao root diam index BSA (cm/m2): 1.9  Asc Ao diam index BSA (cm/m2): 1.8  Asc Ao diam index Ht(cm/m): 2.0  TAPSE: 2.7 cm     Doppler Measurements & Calculations  MV E max curtis: 45.2 cm/sec  MV A max curtis: 59.5 cm/sec  MV E/A: 0.76  MV dec slope: 206.8 cm/sec2  MV dec time: 0.22 sec  Ao V2 max: 122.7 cm/sec  Ao max P.0 mmHg  Ao V2 mean: 80.9 cm/sec  Ao mean PG: 3.1 mmHg  Ao V2 VTI: 22.6 cm  ROWENA(I,D): 3.2 cm2  ROWENA(V,D): 3.2 cm2     LV V1 max P.6 mmHg  LV V1 max: 80.6 cm/sec  LV V1 VTI: 14.8 cm  SV(LVOT): 72.5 ml  SI(LVOT): 38.8 ml/m2  PA acc time: 0.09 sec  TR max curtis: 242.4 cm/sec  TR max P.5 mmHg  AV Curtis Ratio (DI): 0.66  ROWENA Index (cm2/m2): 1.7  E/E' av.6  Lateral E/e': 7.0  Medial E/e': 8.2  RV S Curtis: 19.1 cm/sec     ______________________________________________________________________________  Report approved by: Nathan Hyatt MD on 2025 12:18 PM         CBC with Platelets & Differential     Status: None    Narrative    The following orders were created for panel order CBC with Platelets & Differential.  Procedure                                Abnormality         Status                     ---------                               -----------         ------                     CBC with platelets and ...[[id number]]                      Final result                 Please view results for these tests on the individual orders.     Medications   gadobutrol (GADAVIST) injection 7.5 mL (7.5 mLs Intravenous $Given 3/31/25 0236)   potassium chloride (KLOR-CON) Packet 40 mEq (40 mEq Oral or Feeding Tube $Given 3/31/25 0905)   perflutren diluted 1mL to 2mL with saline (OPTISON) diluted injection 6 mL (6 mLs Intravenous $Given 3/31/25 1000)     Labs Ordered and Resulted from Time of ED Arrival to Time of ED Departure   NT PROBNP INPATIENT - Abnormal       Result Value    N terminal Pro BNP Inpatient 2,447 (*)    COMPREHENSIVE METABOLIC PANEL - Abnormal    Sodium 139      Potassium 3.8      Carbon Dioxide (CO2) 25      Anion Gap 14      Urea Nitrogen 39.9 (*)     Creatinine 1.50 (*)     GFR Estimate 45 (*)     Calcium 10.1      Chloride 100      Glucose 122 (*)     Alkaline Phosphatase 81      AST 30      ALT 20      Protein Total 8.0      Albumin 4.2      Bilirubin Total 0.4     TROPONIN T, HIGH SENSITIVITY - Abnormal    Troponin T, High Sensitivity 36 (*)    TROPONIN T, HIGH SENSITIVITY - Abnormal    Troponin T, High Sensitivity 23 (*)    LIPID REFLEX TO DIRECT LDL PANEL - Abnormal    Cholesterol 128      Triglycerides 84      Direct Measure HDL 38 (*)     LDL Cholesterol Calculated 73      Non HDL Cholesterol 90     HEMOGLOBIN A1C - Abnormal    Estimated Average Glucose 140 (*)     Hemoglobin A1C 6.5 (*)    CBC WITH PLATELETS - Abnormal    WBC Count 7.0      RBC Count 4.32 (*)     Hemoglobin 12.8 (*)     Hematocrit 39.5 (*)     MCV 91      MCH 29.6      MCHC 32.4      RDW 13.8      Platelet Count 190     BASIC METABOLIC PANEL - Abnormal    Sodium 141      Potassium 3.3 (*)     Chloride 104      Carbon Dioxide (CO2) 25      Anion Gap 12      Urea Nitrogen 38.1 (*)      Creatinine 1.40 (*)     GFR Estimate 48 (*)     Calcium 9.3      Glucose 97     MAGNESIUM - Abnormal    Magnesium 1.2 (*)    INR - Normal    INR 0.92     PARTIAL THROMBOPLASTIN TIME - Normal    aPTT 29     MAGNESIUM - Normal    Magnesium 2.0     MAGNESIUM - Normal    Magnesium 2.0     INR - Normal    INR 0.97     CBC WITH PLATELETS AND DIFFERENTIAL    WBC Count 10.7      RBC Count 4.82      Hemoglobin 14.5      Hematocrit 43.8      MCV 91      MCH 30.1      MCHC 33.1      RDW 13.7      Platelet Count 238      % Neutrophils 77      % Lymphocytes 13      % Monocytes 8      % Eosinophils 1      % Basophils 0      % Immature Granulocytes 1      NRBCs per 100 WBC 0      Absolute Neutrophils 8.2      Absolute Lymphocytes 1.4      Absolute Monocytes 0.9      Absolute Eosinophils 0.1      Absolute Basophils 0.0      Absolute Immature Granulocytes 0.1      Absolute NRBCs 0.0       Echo Complete   Final Result      MRA Neck (Carotids) wo & w Contrast   Final Result   IMPRESSION:   HEAD MRI:   1.  No recent infarct, intracranial mass, abnormal enhancement or evidence of intracranial hemorrhage.   2.  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   3.  Mild to moderate volume loss and mild presumed chronic small vessel ischemic changes.      HEAD MRA:   1. No large vessel occlusion or flow-limiting stenosis.      NECK MRA:   1. No hemodynamically significant stenosis or dissection.         MR Brain w/o & w Contrast   Final Result   IMPRESSION:   HEAD MRI:   1.  No recent infarct, intracranial mass, abnormal enhancement or evidence of intracranial hemorrhage.   2.  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   3.  Mild to moderate volume loss and mild presumed chronic small vessel ischemic changes.      HEAD MRA:   1. No large vessel occlusion or flow-limiting stenosis.      NECK MRA:   1. No hemodynamically significant stenosis or dissection.         MRA Brain ([place]) wo Contrast   Final Result  "  IMPRESSION:   HEAD MRI:   1.  No recent infarct, intracranial mass, abnormal enhancement or evidence of intracranial hemorrhage.   2.  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   3.  Mild to moderate volume loss and mild presumed chronic small vessel ischemic changes.      HEAD MRA:   1. No large vessel occlusion or flow-limiting stenosis.      NECK MRA:   1. No hemodynamically significant stenosis or dissection.         CT Head w/o Contrast   Final Result   IMPRESSION:   No acute intracranial findings.       I have personally reviewed the examination and initial interpretation   and I agree with the findings.      CYNTHIA FOY MD            SYSTEM ID:  [id number]      Chest XR,  PA & LAT   Final Result   IMPRESSION:   No acute cardiopulmonary findings.      I have personally reviewed the examination and initial interpretation   and I agree with the findings.      ALEX LADD MD            SYSTEM ID:  [id number]             Critical care was not performed.     Medical Decision Making  The patient's presentation was of high complexity (an acute health issue posing potential threat to life or bodily function).    The patient's evaluation involved:  review of external note(s) from 1 sources (see separate area of note for details)  review of 3+ test result(s) ordered prior to this encounter (see separate area of note for details)  ordering and/or review of 3+ test(s) in this encounter (see separate area of note for details)  discussion of management or test interpretation with another health professional (Cardiology)    The patient's management necessitated high risk (a decision regarding hospitalization).    Assessment & Plan    Tom Summers is a 89 year old male with a history of atrial flutter s/p ablation (10/24), not currently on anticoagulation, HFpEF, hypertension, CAD, who presents to the emergency department with an \"attack\" of dizziness, lightheadedness, as well as severe dyspnea at " baseline and particularly with exertion.  Patient is normally in normal sinus rhythm and is not currently on any anticoagulation.  In the department today he is in atrial fibrillation with an elevated ventricular rate of 107, and becomes significantly dyspneic with any movement.  He does have a history of heart failure with preserved ejection fraction, however he is not exhibiting signs of heart failure exacerbation today as he has no lower extremity edema, no pulmonary edema on chest x-ray on my read, no reports of weight gain.  I suspect the atrial fibrillation contribute to the patient's dyspnea and I spoke with cardiology who recommend admission to their service for further management.  Patient is not a good candidate for electrocardioversion at this moment as he is not anticoagulated and does not know when he flipped into atrial fibrillation.    I have reviewed the nursing notes. I have reviewed the findings, diagnosis, plan and need for follow up with the patient.    Discharge Medication List as of 3/31/2025  1:14 PM          Final diagnoses:   Paroxysmal atrial fibrillation (H)   Exertional dyspnea   Chronic heart failure with preserved ejection fraction (H)     IKerline, am serving as a trained medical scribe to document services personally performed by Jarrell Montemayor MD, based on the provider's statements to me.     Jarrell PEDROZA MD, was physically present and have reviewed and verified the accuracy of this note documented by Kerline Sommer.      Jarrell Montemayor MD    [facility] EMERGENCY DEPARTMENT  3/30/2025     Jarrell Montemayor MD  04/02/25 5112

## 2025-03-31 ENCOUNTER — APPOINTMENT (OUTPATIENT)
Dept: MRI IMAGING | Facility: CLINIC | Age: 89
End: 2025-03-31
Payer: COMMERCIAL

## 2025-03-31 ENCOUNTER — APPOINTMENT (OUTPATIENT)
Dept: OCCUPATIONAL THERAPY | Facility: CLINIC | Age: 89
End: 2025-03-31
Attending: NURSE PRACTITIONER
Payer: COMMERCIAL

## 2025-03-31 ENCOUNTER — APPOINTMENT (OUTPATIENT)
Dept: CARDIOLOGY | Facility: CLINIC | Age: 89
End: 2025-03-31
Attending: NURSE PRACTITIONER
Payer: COMMERCIAL

## 2025-03-31 VITALS
HEART RATE: 67 BPM | BODY MASS INDEX: 27.32 KG/M2 | HEIGHT: 66 IN | SYSTOLIC BLOOD PRESSURE: 118 MMHG | DIASTOLIC BLOOD PRESSURE: 63 MMHG | OXYGEN SATURATION: 97 % | RESPIRATION RATE: 16 BRPM | TEMPERATURE: 98 F | WEIGHT: 170 LBS

## 2025-03-31 LAB
ANION GAP SERPL CALCULATED.3IONS-SCNC: 12 MMOL/L (ref 7–15)
ATRIAL RATE - MUSE: 100 BPM
ATRIAL RATE - MUSE: 72 BPM
ATRIAL RATE - MUSE: NORMAL BPM
BUN SERPL-MCNC: 38.1 MG/DL (ref 8–23)
CALCIUM SERPL-MCNC: 9.3 MG/DL (ref 8.8–10.4)
CHLORIDE SERPL-SCNC: 104 MMOL/L (ref 98–107)
CHOLEST SERPL-MCNC: 128 MG/DL
CREAT SERPL-MCNC: 1.4 MG/DL (ref 0.67–1.17)
DIASTOLIC BLOOD PRESSURE - MUSE: NORMAL MMHG
EGFRCR SERPLBLD CKD-EPI 2021: 48 ML/MIN/1.73M2
ERYTHROCYTE [DISTWIDTH] IN BLOOD BY AUTOMATED COUNT: 13.8 % (ref 10–15)
GLUCOSE SERPL-MCNC: 97 MG/DL (ref 70–99)
HCO3 SERPL-SCNC: 25 MMOL/L (ref 22–29)
HCT VFR BLD AUTO: 39.5 % (ref 40–53)
HDLC SERPL-MCNC: 38 MG/DL
HGB BLD-MCNC: 12.8 G/DL (ref 13.3–17.7)
INR PPP: 0.97 (ref 0.85–1.15)
INTERPRETATION ECG - MUSE: NORMAL
LDLC SERPL CALC-MCNC: 73 MG/DL
LVEF ECHO: NORMAL
MAGNESIUM SERPL-MCNC: 1.2 MG/DL (ref 1.7–2.3)
MAGNESIUM SERPL-MCNC: 2 MG/DL (ref 1.7–2.3)
MCH RBC QN AUTO: 29.6 PG (ref 26.5–33)
MCHC RBC AUTO-ENTMCNC: 32.4 G/DL (ref 31.5–36.5)
MCV RBC AUTO: 91 FL (ref 78–100)
NONHDLC SERPL-MCNC: 90 MG/DL
P AXIS - MUSE: 32 DEGREES
P AXIS - MUSE: 83 DEGREES
P AXIS - MUSE: NORMAL DEGREES
PLATELET # BLD AUTO: 190 10E3/UL (ref 150–450)
POTASSIUM SERPL-SCNC: 3.3 MMOL/L (ref 3.4–5.3)
PR INTERVAL - MUSE: 180 MS
PR INTERVAL - MUSE: 194 MS
PR INTERVAL - MUSE: NORMAL MS
QRS DURATION - MUSE: 100 MS
QRS DURATION - MUSE: 82 MS
QRS DURATION - MUSE: 92 MS
QT - MUSE: 336 MS
QT - MUSE: 382 MS
QT - MUSE: 388 MS
QTC - MUSE: 420 MS
QTC - MUSE: 424 MS
QTC - MUSE: 448 MS
R AXIS - MUSE: 21 DEGREES
R AXIS - MUSE: 33 DEGREES
R AXIS - MUSE: 42 DEGREES
RBC # BLD AUTO: 4.32 10E6/UL (ref 4.4–5.9)
SODIUM SERPL-SCNC: 141 MMOL/L (ref 135–145)
SYSTOLIC BLOOD PRESSURE - MUSE: NORMAL MMHG
T AXIS - MUSE: 229 DEGREES
T AXIS - MUSE: 262 DEGREES
T AXIS - MUSE: 268 DEGREES
TRIGL SERPL-MCNC: 84 MG/DL
VENTRICULAR RATE- MUSE: 107 BPM
VENTRICULAR RATE- MUSE: 72 BPM
VENTRICULAR RATE- MUSE: 73 BPM
WBC # BLD AUTO: 7 10E3/UL (ref 4–11)

## 2025-03-31 PROCEDURE — 83735 ASSAY OF MAGNESIUM: CPT | Performed by: STUDENT IN AN ORGANIZED HEALTH CARE EDUCATION/TRAINING PROGRAM

## 2025-03-31 PROCEDURE — 250N000013 HC RX MED GY IP 250 OP 250 PS 637: Performed by: STUDENT IN AN ORGANIZED HEALTH CARE EDUCATION/TRAINING PROGRAM

## 2025-03-31 PROCEDURE — 97165 OT EVAL LOW COMPLEX 30 MIN: CPT | Mod: GO

## 2025-03-31 PROCEDURE — C8929 TTE W OR WO FOL WCON,DOPPLER: HCPCS

## 2025-03-31 PROCEDURE — 70553 MRI BRAIN STEM W/O & W/DYE: CPT | Mod: 26 | Performed by: RADIOLOGY

## 2025-03-31 PROCEDURE — 70549 MR ANGIOGRAPH NECK W/O&W/DYE: CPT | Mod: 26 | Performed by: RADIOLOGY

## 2025-03-31 PROCEDURE — 250N000013 HC RX MED GY IP 250 OP 250 PS 637: Performed by: INTERNAL MEDICINE

## 2025-03-31 PROCEDURE — 70549 MR ANGIOGRAPH NECK W/O&W/DYE: CPT

## 2025-03-31 PROCEDURE — 93306 TTE W/DOPPLER COMPLETE: CPT | Mod: 26 | Performed by: INTERNAL MEDICINE

## 2025-03-31 PROCEDURE — 255N000002 HC RX 255 OP 636

## 2025-03-31 PROCEDURE — 93005 ELECTROCARDIOGRAM TRACING: CPT

## 2025-03-31 PROCEDURE — G0378 HOSPITAL OBSERVATION PER HR: HCPCS

## 2025-03-31 PROCEDURE — 85610 PROTHROMBIN TIME: CPT | Performed by: STUDENT IN AN ORGANIZED HEALTH CARE EDUCATION/TRAINING PROGRAM

## 2025-03-31 PROCEDURE — 250N000013 HC RX MED GY IP 250 OP 250 PS 637: Performed by: NURSE PRACTITIONER

## 2025-03-31 PROCEDURE — 99232 SBSQ HOSP IP/OBS MODERATE 35: CPT | Mod: GC | Performed by: STUDENT IN AN ORGANIZED HEALTH CARE EDUCATION/TRAINING PROGRAM

## 2025-03-31 PROCEDURE — 70553 MRI BRAIN STEM W/O & W/DYE: CPT

## 2025-03-31 PROCEDURE — 80048 BASIC METABOLIC PNL TOTAL CA: CPT | Performed by: STUDENT IN AN ORGANIZED HEALTH CARE EDUCATION/TRAINING PROGRAM

## 2025-03-31 PROCEDURE — 255N000002 HC RX 255 OP 636: Performed by: INTERNAL MEDICINE

## 2025-03-31 PROCEDURE — 85027 COMPLETE CBC AUTOMATED: CPT | Performed by: STUDENT IN AN ORGANIZED HEALTH CARE EDUCATION/TRAINING PROGRAM

## 2025-03-31 PROCEDURE — 999N000208 ECHOCARDIOGRAM COMPLETE

## 2025-03-31 PROCEDURE — A9585 GADOBUTROL INJECTION: HCPCS

## 2025-03-31 PROCEDURE — 82435 ASSAY OF BLOOD CHLORIDE: CPT | Performed by: STUDENT IN AN ORGANIZED HEALTH CARE EDUCATION/TRAINING PROGRAM

## 2025-03-31 PROCEDURE — 70544 MR ANGIOGRAPHY HEAD W/O DYE: CPT

## 2025-03-31 PROCEDURE — 36415 COLL VENOUS BLD VENIPUNCTURE: CPT | Performed by: STUDENT IN AN ORGANIZED HEALTH CARE EDUCATION/TRAINING PROGRAM

## 2025-03-31 PROCEDURE — 99239 HOSP IP/OBS DSCHRG MGMT >30: CPT | Mod: FS | Performed by: NURSE PRACTITIONER

## 2025-03-31 PROCEDURE — 97530 THERAPEUTIC ACTIVITIES: CPT | Mod: GO

## 2025-03-31 RX ORDER — DILTIAZEM HYDROCHLORIDE 120 MG/1
120 CAPSULE, EXTENDED RELEASE ORAL DAILY
Qty: 90 CAPSULE | Refills: 3 | Status: SHIPPED | OUTPATIENT
Start: 2025-03-31

## 2025-03-31 RX ORDER — POTASSIUM CHLORIDE 750 MG/1
20 TABLET, EXTENDED RELEASE ORAL
Status: DISCONTINUED | OUTPATIENT
Start: 2025-03-31 | End: 2025-03-31 | Stop reason: HOSPADM

## 2025-03-31 RX ORDER — MAGNESIUM OXIDE 400 MG/1
400 TABLET ORAL EVERY 4 HOURS
Status: DISCONTINUED | OUTPATIENT
Start: 2025-03-31 | End: 2025-03-31 | Stop reason: HOSPADM

## 2025-03-31 RX ORDER — POTASSIUM CHLORIDE 750 MG/1
40 TABLET, EXTENDED RELEASE ORAL
Status: DISCONTINUED | OUTPATIENT
Start: 2025-03-31 | End: 2025-03-31 | Stop reason: HOSPADM

## 2025-03-31 RX ORDER — LIDOCAINE 40 MG/G
CREAM TOPICAL
Status: DISCONTINUED | OUTPATIENT
Start: 2025-03-31 | End: 2025-03-31 | Stop reason: HOSPADM

## 2025-03-31 RX ORDER — POTASSIUM CHLORIDE 1.5 G/1.58G
40 POWDER, FOR SOLUTION ORAL ONCE
Status: COMPLETED | OUTPATIENT
Start: 2025-03-31 | End: 2025-03-31

## 2025-03-31 RX ORDER — GADOBUTROL 604.72 MG/ML
7.5 INJECTION INTRAVENOUS ONCE
Status: COMPLETED | OUTPATIENT
Start: 2025-03-31 | End: 2025-03-31

## 2025-03-31 RX ORDER — MAGNESIUM SULFATE HEPTAHYDRATE 40 MG/ML
2 INJECTION, SOLUTION INTRAVENOUS
Status: DISCONTINUED | OUTPATIENT
Start: 2025-03-31 | End: 2025-03-31 | Stop reason: HOSPADM

## 2025-03-31 RX ADMIN — MAGNESIUM OXIDE TAB 400 MG (241.3 MG ELEMENTAL MG) 400 MG: 400 (241.3 MG) TAB at 09:05

## 2025-03-31 RX ADMIN — HUMAN ALBUMIN MICROSPHERES AND PERFLUTREN 6 ML: 10; .22 INJECTION, SOLUTION INTRAVENOUS at 10:00

## 2025-03-31 RX ADMIN — ATORVASTATIN CALCIUM 40 MG: 40 TABLET, FILM COATED ORAL at 09:05

## 2025-03-31 RX ADMIN — DILTIAZEM HYDROCHLORIDE 120 MG: 120 CAPSULE, COATED, EXTENDED RELEASE ORAL at 09:06

## 2025-03-31 RX ADMIN — POLYETHYLENE GLYCOL 3350 17 G: 17 POWDER, FOR SOLUTION ORAL at 09:05

## 2025-03-31 RX ADMIN — CHLORTHALIDONE 25 MG: 25 TABLET ORAL at 09:07

## 2025-03-31 RX ADMIN — APIXABAN 5 MG: 5 TABLET, FILM COATED ORAL at 09:05

## 2025-03-31 RX ADMIN — POTASSIUM CHLORIDE 40 MEQ: 1.5 POWDER, FOR SOLUTION ORAL at 09:05

## 2025-03-31 RX ADMIN — GADOBUTROL 7.5 ML: 604.72 INJECTION INTRAVENOUS at 02:36

## 2025-03-31 ASSESSMENT — ACTIVITIES OF DAILY LIVING (ADL)
ADLS_ACUITY_SCORE: 61
ADLS_ACUITY_SCORE: 59
ADLS_ACUITY_SCORE: 61
ADLS_ACUITY_SCORE: 59
ADLS_ACUITY_SCORE: 61
ADLS_ACUITY_SCORE: 59
ADLS_ACUITY_SCORE: 61
ADLS_ACUITY_SCORE: 59
ADLS_ACUITY_SCORE: 61
ADLS_ACUITY_SCORE: 59

## 2025-03-31 NOTE — MEDICATION SCRIBE - ADMISSION MEDICATION HISTORY
Medication Scribe Admission Medication History    Admission medication history is complete. The information provided in this note is only as accurate as the sources available at the time of the update.    Information Source(s): Patient via in-person    Pertinent Information: Tom reports only taking the Ascorbic Acid (VITAMIN C PO) when he is starting to feel sick and took a tablet a couple of days ago. He reports no changes in current medications and has not started the atorvastatin 40 MG tablet since this medication was prescribed today. Patient denies taking any other medications. No dispense report and outside medication reconciliation list have been reviewed.     Changes made to PTA medication list:  Added: None  Deleted: None  Changed: None    Allergies reviewed with patient and updates made in EHR: yes    Medication History Completed By: Miya Lopez 3/30/2025 7:21 PM    PTA Med List   Medication Sig Note Last Dose/Taking    Ascorbic Acid (VITAMIN C PO) Take 500 mg by mouth as needed (when feeling sick/has a cold). Dose unclear. Possibly 500-1000 mg or 2-3 tabs  3/28/2025    chlorthalidone (HYGROTON) 25 MG tablet Take 1 tablet (25 mg) by mouth daily.  3/30/2025 Noon    diltiazem ER (DILT-XR) 120 MG 24 hr capsule Take 1 capsule (120 mg) by mouth daily.  3/30/2025 Noon    atorvastatin (LIPITOR) 40 MG tablet Take 1 tablet (40 mg) by mouth daily 3/30/2025: Has not started Taking
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
## 2025-03-31 NOTE — PLAN OF CARE
"Vital signs:  Temp: 97.8  F (36.6  C) Temp src: Oral BP: 118/75 Pulse: 76   Resp: 18 SpO2: 97 % O2 Device: None (Room air)   Height: 167.6 cm (5' 6\") Weight: 77.1 kg (170 lb)  Estimated body mass index is 27.44 kg/m  as calculated from the following:    Height as of this encounter: 1.676 m (5' 6\").    Weight as of this encounter: 77.1 kg (170 lb).       Problem: Adult Inpatient Plan of Care  Goal: Absence of Hospital-Acquired Illness or Injury  Outcome: Progressing     Problem: Adult Inpatient Plan of Care  Goal: Optimal Comfort and Wellbeing  Outcome: Progressing     Problem: Adult Inpatient Plan of Care  Goal: Readiness for Transition of Care  Outcome: Progressing   Goal Outcome Evaluation:      Plan of Care Reviewed With: patient, spouse                 "

## 2025-03-31 NOTE — PROGRESS NOTES
[facility]                                                                                   OUTPATIENT OCCUPATIONAL THERAPY    PLAN OF TREATMENT FOR OUTPATIENT REHABILITATION   Patient's Last Name, First Name, Tom Douglas YOB: 1935   Provider's Name   [facility]   Medical Record No.  [id number]     Onset Date: 03/30/25 Start of Care Date: 03/31/25     Medical Diagnosis:  heart failure               OT Diagnosis:  decreased engagement in IADLs Certification Dates:  From: 03/31/25  To: 03/31/25     See note for plan of treatment, functional goals, and certification details.    I CERTIFY THE NEED FOR THESE SERVICES FURNISHED UNDER        THIS PLAN OF TREATMENT AND WHILE UNDER MY CARE (Physician co-signature of this document indicates review and certification of the therapy plan).                03/31/25 0906   Appointment Info   Signing Clinician's Name / Credentials (OT) GONZALEZ Vila/L   Quick Adds   Quick Adds Certification   Living Environment   People in Home spouse   Current Living Arrangements house   Home Accessibility stairs to enter home;stairs within home   Number of Stairs, Main Entrance 1   Stair Railings, Main Entrance none   Number of Stairs, Within Home, Primary greater than 10 stairs  (down to basement)   Transportation Anticipated family or friend will provide   Living Environment Comments Pt reports living in house with wife. Has 1 stair to enter, flight down to basement but does not need to use.   Self-Care   Usual Activity Tolerance excellent   Current Activity Tolerance good   Regular Exercise Other (see comments)   Equipment Currently Used at Home none   Fall history within last six months no   Activity/Exercise/Self-Care Comment Pt reports IND at baseline for ADLs. Reports decrease in activity recently 2/2 ongoing SOB but reports maintains active lifestyle working on his property.   Instrumental  Activities of Daily Living (IADL)   Previous Responsibilities meal prep;housekeeping;laundry;medication management;yardwork;driving;finances;shopping   IADL Comments Pt reports sharing IADLs responsiblities with wife. Reports wife can assist at discharge.   General Information   Onset of Illness/Injury or Date of Surgery 03/30/25   Referring Physician Carmen Brooks APRN CNP   Patient/Family Therapy Goal Statement (OT) To go home   Additional Occupational Profile Info/Pertinent History of Current Problem Per chart: 89 year old male with a past medical history of heart failure with preserved ejection fraction, hypertension, and atrial flutter with recent ablation in 10/2024. He presents with acute onset dyspnea and lightheadedness and was found to be in atrial fibrillation.   Existing Precautions/Restrictions fall;cardiac   General Observations and Info Activity Ambulate   Cognitive Status Examination   Orientation Status orientation to person, place and time   Cognitive Status Comments Pt appears grossly cognitively intact throughout obs/inerview.   Visual Perception   Visual Impairment/Limitations WFL   Sensory   Sensory Quick Adds sensation intact   Pain Assessment   Patient Currently in Pain No   Posture   Posture not impaired   Range of Motion Comprehensive   General Range of Motion bilateral upper extremity ROM WFL   Strength Comprehensive (MMT)   Comment, General Manual Muscle Testing (MMT) Assessment Appears WFL for ADLs   Muscle Tone Assessment   Muscle Tone Quick Adds No deficits were identified   Coordination   Upper Extremity Coordination No deficits were identified   Bed Mobility   Bed Mobility No deficits identified   Transfers   Transfers No deficits identified   Balance   Balance Assessment no deficits identified   Activities of Daily Living   BADL Assessment/Intervention no deficits identified  (primarily limited by decreased activity tolerance)   Clinical Impression   Criteria for Skilled  Therapeutic Interventions Met (OT) Yes, treatment indicated   OT Diagnosis decreased engagement in IADLs   OT Problem List-Impairments impacting ADL problems related to;activity tolerance impaired   Assessment of Occupational Performance 1-3 Performance Deficits   Identified Performance Deficits home mgmt, yardwork   Planned Therapy Interventions (OT) IADL retraining;progressive activity/exercise;home program guidelines   Clinical Decision Making Complexity (OT) problem focused assessment/low complexity   Risk & Benefits of therapy have been explained evaluation/treatment results reviewed;care plan/treatment goals reviewed;risks/benefits reviewed;current/potential barriers reviewed;participants voiced agreement with care plan;participants included;patient   Clinical Impression Comments Pt appropriate for 1 time eval/treat from skilled IP OT/CR to progress activity tolerance for ADLs/IADLs   OT Total Evaluation Time   OT Eval, Low Complexity Minutes (85319) 6   Therapy Certification   Medical Diagnosis heart failure   Start of Care Date 03/31/25   Certification date from 03/31/25   Certification date to 03/31/25   OT Goals   Therapy Frequency (OT) One time eval and treatment   OT Predicted Duration/Target Date for Goal Attainment 03/31/25   OT Goals Cardiac Phase 1;OT Goal 1   OT: Understanding of cardiac education to maximize quality of life, condition management, and health outcomes Patient;Verbalize;Goal Met   OT: Functional/aerobic ambulation tolerance with stable cardiovascular response in order to return to home and community environment Independent;200 feet;Goal Met   OT: Goal 1 Pt will verbalize understanding of walking HEP to progress activity tolernace for ADLs/IADLs. Goal Met.   Interventions   Interventions Quick Adds Therapeutic Activity   Therapeutic Activities   Therapeutic Activity Minutes (70213) 10   Symptoms noted during/after treatment none   Treatment Detail/Skilled Intervention Faciltiated  functional ambulation in hallway to progress activity tolerance for ADLs. Increased time for line mgmt. HR  throughout, initially reports dizziness but improves with activity, completes 200' IND. Educated on time between positional changes for prevention of falls. Also provided with walking HEP and education on progressing activity upon return to home. Pt verbalizing understanding.   OT Discharge Planning   OT Plan d/c OT/CR   OT Discharge Recommendation (DC Rec) home with assist   OT Rationale for DC Rec Pt slightly below baseline but anticipate safe to d/c to home once medically cleared.   OT Brief overview of current status IND, encourage hallway ambulation 3-4x/day   OT Total Distance Amb During Session (feet) 200   Total Session Time   Timed Code Treatment Minutes 10   Total Session Time (sum of timed and untimed services) 16

## 2025-03-31 NOTE — DISCHARGE SUMMARY
[address] [address]  p: [phone]    Discharge Summary: Cardiology Service    Tom Summers MRN# [id number]   YOB: 1935 Age: 89 year old       Admission Date: 03/30/2025  Discharge Date: 03/31/2025    Discharge Diagnoses:  # Paroxysmal atrial fibrillation  # History of atrial flutter  # Moderate Mitral Regurgitation  # Transient neurologic deficits   # Nonischemic myocardial injury   # Chronic heart failure with preserved ejection fraction, ACC/AHA stage C, NYHA II   # Coronary artery disease   # Acute kidney injury  # HTN  # Overweight  # Financial/Environmental concerns    Brief HPI:  Tom Summers is a 89 year old male with a history of heart failure with preserved ejection fraction, hypertension, and atrial flutter with recent ablation in 10/2024. He presents with acute onset dyspnea and lightheadedness and was found to be in atrial fibrillation. He is admitted to the general cardiology service for further evaluation and management. Patient self-converted to NSR 3/31N after initiation of diltiazem.     Hospital Course by Diagnosis:  # Paroxysmal atrial fibrillation  # History of atrial flutter  # Moderate Mitral Regurgitation  # HTN  # Nonischemic myocardial injury  # Chronic heart failure with preserved ejection fraction, ACC/AHA stage C, NYHA II  # Coronary artery disease  # Overweight  Patient was admitted to [facility] in 10/2024 with new diagnosis of atrial flutter. He underwent successful ablation that admission and has not had recurrence. He presents this admission with dyspnea and lightheadedness with new diagnosis of atrial fibrillation. He spontaneously converted to NSR 3/31 overnight. High sensitivity troponin 36 --> 23. No new ischemic changes on EKG. Suspect secondary to atrial fibrillation as above.  - [id number] 4 (age x2, hypertension, heart failure) pharmacy consult for DOAC coverage, start Apixiban 5 mg  "BID (initially dosed at 2.5 mg BID with GENEVA, this has resolved)   - Diltiazem 120 mg daily  - Fluid status: euvolemic weight at discharge 170 lbs  - Pharmacy consult for SGLT2i   - Continue PTA statin and chlorthalidone  - Stop ASA with initiation of DOAC and no recent intervention  - 14 day ZioPatch  - Cardiology follow up with Dr. Hyatt as previously scheduled 4/30     # Transient neurologic deficits  Patient's family reports transient episode of neurologic deficits including blurred vision in right eye as well as slurred speech. New onset of atrial fibrillation as above, at increased risk for cerebrovascular event.  - CT head 3/30 without acute intracranial processes  - Neurology consulted for recommendations regarding need for additional evaluation   MRI/MRA Brain with no large vessel occlusions, small chronic infarcts and mild to moderate volume loss with presumed chronic small vessel ischemic changes, no hemodynamically significant stenosis or dissection  - PT/OT consulted: home with assist     # Acute kidney injury, improving  Baseline SCr ~1.1, elevated to 1.5 on admission. Suspect he is dependent on atrial kick with diastolic dysfunction, expected to improve with restoration of sinus rhythm.  - Crt 1.4 (1.5), BMP 1 week      Clinically Significant Risk Factors Present on Admission   # Hypertension: Noted on problem list   # Overweight: Estimated body mass index is 27.44 kg/m  as calculated from the following:    Height as of this encounter: 1.676 m (5' 6\").    Weight as of this encounter: 77.1 kg (170 lb).       # Financial/Environmental Concerns:        Pertinent Procedures:  1. None    Consults:  Neurology    Medication Changes:  START Eliquis 5 mg BID  START Diltiazem 120 mg daily     Discharge medications:   Current Discharge Medication List        START taking these medications    Details   apixaban ANTICOAGULANT (ELIQUIS) 5 MG tablet Take 1 tablet (5 mg) by mouth 2 times daily.  Qty: 180 tablet, " Refills: 3    Associated Diagnoses: Paroxysmal atrial fibrillation (H)           CONTINUE these medications which have CHANGED    Details   diltiazem ER (DILT-XR) 120 MG 24 hr capsule Take 1 capsule (120 mg) by mouth daily.  Qty: 90 capsule, Refills: 3    Associated Diagnoses: Benign essential hypertension           CONTINUE these medications which have NOT CHANGED    Details   Ascorbic Acid (VITAMIN C PO) Take 500 mg by mouth as needed (when feeling sick/has a cold). Dose unclear. Possibly 500-1000 mg or 2-3 tabs      atorvastatin (LIPITOR) 40 MG tablet Take 1 tablet (40 mg) by mouth daily  Qty: 30 tablet, Refills: 11    Associated Diagnoses: Hyperlipidemia LDL goal <70      chlorthalidone (HYGROTON) 25 MG tablet Take 1 tablet (25 mg) by mouth daily.  Qty: 90 tablet, Refills: 11    Associated Diagnoses: Benign essential hypertension           STOP taking these medications       levocetirizine (XYZAL) 5 MG tablet Comments:   Reason for Stopping:               Follow-up:  PCP 1 week  Cardiology 4/30    Labs or imaging requiring follow-up after discharge:  [facility]    Code status:  FULL    Condition on discharge  Temp:  [97.6  F (36.4  C)-98.9  F (37.2  C)] 98  F (36.7  C)  Pulse:  [] 67  Resp:  [16-18] 16  BP: ()/(52-95) 118/63  SpO2:  [95 %-99 %] 97 %  General: Alert, interactive, no acute distress  HEENT: Normocephalic, atraumatic. Sclera anicteric.   Neck: JVP not elevated  Cardiovascular: Regular rate and rhythm, normal S1 and S2, no murmurs, gallops, or rubs. Radial and pedal pulses palpable bilaterally.   Resp: Regular work of breathing on room air. Clear to auscultation bilaterally, no rales, wheezes, or rhonchi  GI: Soft, nontender, nondistended.   Extremities: no edema, no cyanosis or clubbing, warm and well perfused  Skin: Warm and dry, no jaundice or rash  Neuro: Alert and oriented x 3. CN 2-12 intact, moves all extremities equally, normal speech  Psych: Mood and affect are appropriate    Imaging  with results:  EKG 3/30/25: AF with RVR      EKG 3/31/25: NSR      Echocardiogram 3/31/25:  Interpretation Summary  Global and regional left ventricular function is normal with an EF of 55-60%.  Left ventricular diastolic function is abnormal.  Right ventricular function, chamber size, wall motion, and thickness are  normal.  Moderate left atrial enlargement is present.  Mild right atrial enlargement is present.  Pulmonary artery systolic pressure is normal.     This study was compared with the study from 10/21/24. Mitral regurgitation has  improved.    Recent Labs   Lab 03/31/25  0632 03/30/25  1715 03/30/25  1432     --  139   POTASSIUM 3.3*  --  3.8   CHLORIDE 104  --  100   CO2 25  --  25   ANIONGAP 12  --  14   GLC 97  --  122*   BUN 38.1*  --  39.9*   CR 1.40*  --  1.50*   GFRESTIMATED 48*  --  45*   GABBY 9.3  --  10.1   MAG 2.0 1.2* 2.0         Patient Care Team:  No Ref-Primary, Physician as PCP - General  Jose L Berkowitz MD as MD (Dermatology)  Karlos Ashraf MD as MD (Cardiovascular Disease)  Malathi Lipscomb APRN CNP as Nurse Practitioner (Cardiovascular Disease)  Sonal Lima NP as Nurse Practitioner (Pulmonary Disease)  Malathi Lipscomb APRN CNP as Assigned Heart and Vascular Provider  Sonal Lima NP as Assigned Pulmonology Provider  Jose L Berkowitz MD as Assigned Dermatology Provider    Time Spent on this Encounter   I, FARHAD Bender CNP, personally saw the patient today and spent greater than 30 minutes discharging this patient.    >30 minutes spent in discharge, including >50% in counseling and coordination of care, medication review and plan of care recommended on follow up. Questions were answered.   It was our pleasure to care for Tom Summers during this hospitalization. Please do not hesitate to contact me should there be questions regarding the hospital course or discharge plan.    Patient discussed with staff  cardiologist, Dr. Daniels, who agrees with the above documentation and plan. Documentation represents joint decision making.     FARHAD Skelton, CNP  [facility]

## 2025-03-31 NOTE — PROGRESS NOTES
"[facility]    Vascular Neurology Progress Note    Interval History     No acute events overnight.  He denies any recurrent speech or visual symptoms. Continues to feel at his baseline.     Hospital Course     Chief complaint: Hypotension    Tom Summers is a 89 year old male past medical history of heart failure with preserved ejection fraction, hypertension, atrial flutter with recent ablation in 10/2024 and newly diagnosed atrial fibrillation. He presented to the ED for evaluation of dyspnea, dizziness, and transient speech changes and visual changes. On 3/30 around 9am, the patient was sitting when his wife when he had onset of shortness of breath, dizziness, slurred speech, and visual change. In terms of his neurologic symptoms, he remembers having difficultly expressing his thoughts and believes he had mildly decreased vision out of the right side of his peripheral vision. When asked to clarify \"slurred speech\" he says that he was having difficulty speaking the words coming to his mind, more consistent with expressive aphasia. The neurologic symptoms lasted \"less than 5 minutes\", but other symptoms of dyspnea and dizziness persisted. He denied feeling weakness or numbness on one side, and he feels back to his baseline currently. He also denied heart palpitations during this event and denied a history of stroke or symptoms of TIA prior to this event.      On arrival to the ED, he complained of dizziness and dyspnea. He was found to be in atrial fibrillation and had borderline low BP. CT Head and MRI Brain did not show intracranial pathology. TTE grossly within normal limits.      Assessment and Plan   89 year old male past medical history of heart failure with preserved ejection fraction, hypertension, atrial flutter with recent ablation in 10/2024 admitted for new onset atrial fibrillation. The patient had a 5 minute episode this morning of transient word " finding difficulty and right hemianopsia which could be concerning for a TIA localizing to the left MCA territory. Alternatively, could consider hypoperfusion in the context of new onset atrial fibrillation, though favor TIA given localization. We agree with cardiology's plan for anticoagulation. Recommend full dose apixiban and increased statin as below for secondary stroke prevention.       1. Transient ischemic attack  -Start apixaban 5 mg BID  -Increase to atorvastatin 80 mg daily  -Long term BP goal <130/80  -Long term LDL goal <70  -Long term Hgb A1c goal <7%  -Follow up with neurology clinic in 8-12 weeks      DVT Prophylaxis: Ok from pharmacologic ppx from stroke perspective, defer to primary service    Patient Follow-up    - in 8-12 weeks with general neurology ([phone])    No further stroke evaluation is recommended, so we will sign off. Please contact us with any additional questions.      The patient was discussed with Stroke Staff, Dr. Martinez.    Maximilian Patton MD  Neurology Resident    Physical Examination     Temp: 98  F (36.7  C) Temp src: Oral BP: 118/63 Pulse: 67   Resp: 16 SpO2: 97 % O2 Device: None (Room air)      Neurologic  Mental Status:  alert, oriented x 3, follows commands, speech clear and fluent, naming and repetition normal  Cranial Nerves:  visual fields intact, PERRL, EOMI with normal smooth pursuit, facial sensation intact and symmetric, facial movements symmetric, hearing not formally tested but intact to conversation, palate elevation symmetric and uvula midline, no dysarthria, shoulder shrug strong bilaterally, tongue protrusion midline  Motor:  normal muscle tone and bulk, no abnormal movements, able to move all limbs spontaneously, strength 5/5 throughout upper and lower extremities, no pronator drift  Reflexes:  no clonus  Sensory:  light touch sensation intact and symmetric throughout upper and lower extremities, no extinction on double simultaneous stimulation    Coordination:  normal finger-to-nose and heel-to-shin bilaterally without dysmetria, rapid alternating movements symmetric  Station/Gait:  deferred    Stroke Scales       NIHSS  1a. Level of Consciousness 0-->Alert, keenly responsive   1b. LOC Questions 0-->Answers both questions correctly   1c. LOC Commands 0-->Performs both tasks correctly   2.   Best Gaze 0-->Normal   3.   Visual 0-->No visual loss   4.   Facial Palsy 0-->Normal symmetrical movements   5a. Motor Arm, Left 0-->No drift, limb holds 90 (or 45) degrees for full 10 secs   5b. Motor Arm, Right 0-->No drift, limb holds 90 (or 45) degrees for full 10 secs   6a. Motor Leg, Left 0-->No drift, leg holds 30 degree position for full 5 secs   6b. Motor Leg, right 0-->No drift, leg holds 30 degree position for full 5 secs   7.   Limb Ataxia 0-->Absent   8.   Sensory 0-->Normal, no sensory loss   9.   Best Language 0-->No aphasia, normal   10. Dysarthria 0-->Normal   11. Extinction and Inattention  0-->No abnormality   Total 0 (03/31/25 1321)       Imaging/Labs   (Bolded imaging and labs new and/or personally reviewed or re-reviewed by me today)    MRI/Head CT HEAD MRI:  1.  No recent infarct, intracranial mass, abnormal enhancement or evidence of intracranial hemorrhage.  2.  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  3.  Mild to moderate volume loss and mild presumed chronic small vessel ischemic changes.   Intracranial Vasculature HEAD MRA:  1. No large vessel occlusion or flow-limiting stenosis.   Cervical Vasculature NECK MRA:  1. No hemodynamically significant stenosis or dissection     Echocardiogram Global and regional left ventricular function is normal with an EF of 55-60%.  Left ventricular diastolic function is abnormal.  Right ventricular function, chamber size, wall motion, and thickness are  normal.  Moderate left atrial enlargement is present.  Mild right atrial enlargement is present.  Pulmonary artery systolic pressure is normal.    EKG/Telemetry Atrial fibrillation     LDL  3/30/2025: 73 mg/dL   A1C  3/30/2025: 6.5 %   Troponin 3/30/2025: 23 ng/L

## 2025-03-31 NOTE — PLAN OF CARE
Goal Outcome Evaluation:      Plan of Care Reviewed With: patient, spouse    Overall Patient Progress: improvingOverall Patient Progress: improving    Outcome Evaluation: discharging today    Pt discharging home, reviewed discharge instructions including stroke education, new meds (eliquis and diltiazem), follow ups. Pt will be doing a Zio patch which will be mailed to pt. Pt and family verbalized understanding. Wife providing ride home.

## 2025-03-31 NOTE — CONSULTS
Discharge Pharmacy Test Claim    Patient's [employer] Medicare Advantage plan covers Eliquis, Xarelto, Jardiance and Farxiga. Patient has an unmet $250 deductible. Expected initial copay and expected monthly copay after deductible is met are listed below.    Test Claim Initial Copay Copay after deductible is met   Eliquis 291.40 41.40     Xarelto 291.40 41.40     Jardiance 291.40 41.40     Farxiga 291.40 41.40       [facility] [facility] has one-time use 30-day free trial vouchers available for eliquis or xarelto as well as 30-day free trial voucher for Farxiga.      Jodee Nichols  [facility] Pharmacy Liaison (A - L)  Phone: [phone] Fax: [phone]  Available on Teams & Vocera  Disclaimer: Pharmacy test claims are extimates and may not reflect final costs. Suggested alternatives aim to be cost-effective but may not be therapeutically equivalent as this consult is informational and does not constitute medical advice. Clinical decisions should be made by qualified healthcare providers.

## 2025-04-01 ENCOUNTER — PATIENT OUTREACH (OUTPATIENT)
Dept: CARE COORDINATION | Facility: CLINIC | Age: 89
End: 2025-04-01
Payer: COMMERCIAL

## 2025-04-01 RX ORDER — CHLORTHALIDONE 25 MG/1
12.5 TABLET ORAL DAILY
Qty: 45 TABLET | Refills: 2 | COMMUNITY
Start: 2025-04-01

## 2025-04-01 NOTE — PROGRESS NOTES
"  [facility]: Transitions of Care Outreach  Chief Complaint   Patient presents with    Clinic Care Coordination - Post Hospital       Most Recent Admission Date: 3/30/2025   Most Recent Admission Diagnosis: Paroxysmal atrial fibrillation (H) - I48.0  Exertional dyspnea - R06.09  Chronic heart failure with preserved ejection fraction (H) - I50.32     Most Recent Discharge Date: 3/31/2025   Most Recent Discharge Diagnosis: Paroxysmal atrial fibrillation (H) - I48.0  Exertional dyspnea - R06.09  Chronic heart failure with preserved ejection fraction (H) - I50.32  Benign essential hypertension - I10     Transitions of Care Assessment    Discharge Assessment  How are you doing now that you are home?: \" I am feeling much better \"  How are your symptoms? (Red Flag symptoms escalate to triage hotline per guidelines): Improved  Do you know how to contact your clinic care team if you have future questions or changes to your health status? : Yes  Does the patient have their discharge instructions? : Yes  Does the patient have questions regarding their discharge instructions? : No  Were you started on any new medications or were there changes to any of your previous medications? : Yes  Does the patient have all of their medications?: Yes  Do you have questions regarding any of your medications? : No  Do you have all of your needed medical supplies or equipment (DME)?  (i.e. oxygen tank, CPAP, cane, etc.): Yes    Post-op ([facility] CTA Only)  If the patient had a surgery or procedure, do they have any questions for a nurse?: No           Follow up Plan     Discharge Follow-Up  Discharge follow up appointment scheduled in alignment with recommended follow up timeframe or Transitions of Risk Category? (Low = within 30 days; Moderate= within 14 days; High= within 7 days): No  Patient's follow up appointment not scheduled: Patient declined scheduling support. Education on the importance of transitions of care follow " up. Provided scheduling phone number.    Future Appointments   Date Time Provider Department Center   4/30/2025  9:00 AM  LAB [facility]   4/30/2025  9:30 AM Nathan Hyatt MD [facility]       Outpatient Plan as outlined on AVS reviewed with patient.    For any urgent concerns, please contact our 24 hour nurse triage line: [phone] ([phone])       JOHNSON Moore

## 2025-04-01 NOTE — TELEPHONE ENCOUNTER
Date: 4/1/2025    Time of Call: 12:58 PM     Diagnosis:  HF     [ VORB ] Ordering provider: Dr. Hyatt  Order: Would hold chlorthalidone until Saturday and get a basic metabolic panel and ECG.   On Sunday restart chlorthalidone at 12.5mg (1/2 pill)      Order received by: Lucien Mesa RN     Follow-up/additional notes: orders placed, patient sent a [employer] message with recommendations and scheduling number.

## 2025-04-05 ENCOUNTER — MYC MEDICAL ADVICE (OUTPATIENT)
Dept: CARDIOLOGY | Facility: CLINIC | Age: 89
End: 2025-04-05
Payer: COMMERCIAL

## 2025-04-07 RX ORDER — LEVOCETIRIZINE DIHYDROCHLORIDE 5 MG/1
5 TABLET, FILM COATED ORAL DAILY
COMMUNITY

## 2025-04-15 ENCOUNTER — MYC MEDICAL ADVICE (OUTPATIENT)
Dept: CARDIOLOGY | Facility: CLINIC | Age: 89
End: 2025-04-15
Payer: COMMERCIAL

## 2025-04-15 DIAGNOSIS — I10 BENIGN ESSENTIAL HYPERTENSION: ICD-10-CM

## 2025-04-16 RX ORDER — CHLORTHALIDONE 25 MG/1
12.5 TABLET ORAL EVERY OTHER DAY
Qty: 45 TABLET | Refills: 4 | Status: SHIPPED | OUTPATIENT
Start: 2025-04-16

## 2025-04-16 NOTE — TELEPHONE ENCOUNTER
Called and spoke with patient. He states that he has been feeling weak for the last month. His BP's have been mostly low 100's/60's with a HR in the 80's. He is currently taking Diltiazem  MG once daily and Chlorthalidone 12.5 MG once daily. He does have a history of afib/flutter however can not tell when he is in afib/flutter. He is currently finishing up a 2 week zio patch heart monitor, will be done tomorrow. He does have a follow up appointment with Dr. Hyatt in 2 weeks. Provider notified, new orders received.  Date: 4/16/2025    Time of Call: 10:15 AM     Diagnosis:  Weakness, HTN     [ VORB ] Ordering provider: Dr. Hyatt  Order: Decrease Chlorthalidone to 12.5 MG every other day. Take Chlorthalidone at bedtime.     Order received by: Lucien Mesa RN     Follow-up/additional notes: Patient was called and notified and is agreeable to plan.

## 2025-04-21 ENCOUNTER — MYC MEDICAL ADVICE (OUTPATIENT)
Dept: CARDIOLOGY | Facility: CLINIC | Age: 89
End: 2025-04-21
Payer: COMMERCIAL

## 2025-04-22 NOTE — TELEPHONE ENCOUNTER
Called and spoke with patient. He states he has not tried the suggestion given to him to decrease the Chlorthalidone to every other day. He was also told that the Eliquis would not cause lower BP's. He says he will try taking the Chlorthalidone every other day in the morning and will take the Diltiazem in the evening.
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
## 2025-04-30 ENCOUNTER — OFFICE VISIT (OUTPATIENT)
Dept: CARDIOLOGY | Facility: CLINIC | Age: 89
End: 2025-04-30
Attending: INTERNAL MEDICINE
Payer: COMMERCIAL

## 2025-04-30 ENCOUNTER — LAB (OUTPATIENT)
Dept: LAB | Facility: CLINIC | Age: 89
End: 2025-04-30
Attending: INTERNAL MEDICINE
Payer: COMMERCIAL

## 2025-04-30 VITALS
WEIGHT: 168 LBS | BODY MASS INDEX: 27.12 KG/M2 | HEART RATE: 74 BPM | SYSTOLIC BLOOD PRESSURE: 157 MMHG | OXYGEN SATURATION: 99 % | DIASTOLIC BLOOD PRESSURE: 86 MMHG

## 2025-04-30 DIAGNOSIS — I25.10 NONOBSTRUCTIVE ATHEROSCLEROSIS OF CORONARY ARTERY: ICD-10-CM

## 2025-04-30 DIAGNOSIS — I50.30 HEART FAILURE WITH PRESERVED EJECTION FRACTION, NYHA CLASS I (H): ICD-10-CM

## 2025-04-30 DIAGNOSIS — Z86.79 HISTORY OF CARDIOMYOPATHY: ICD-10-CM

## 2025-04-30 DIAGNOSIS — E78.5 HYPERLIPIDEMIA LDL GOAL <70: ICD-10-CM

## 2025-04-30 DIAGNOSIS — I10 BENIGN ESSENTIAL HYPERTENSION: ICD-10-CM

## 2025-04-30 LAB
ANION GAP SERPL CALCULATED.3IONS-SCNC: 9 MMOL/L (ref 7–15)
BUN SERPL-MCNC: 18.7 MG/DL (ref 8–23)
CALCIUM SERPL-MCNC: 9.3 MG/DL (ref 8.8–10.4)
CHLORIDE SERPL-SCNC: 104 MMOL/L (ref 98–107)
CREAT SERPL-MCNC: 1.27 MG/DL (ref 0.67–1.17)
EGFRCR SERPLBLD CKD-EPI 2021: 54 ML/MIN/1.73M2
GLUCOSE SERPL-MCNC: 100 MG/DL (ref 70–99)
HCO3 SERPL-SCNC: 26 MMOL/L (ref 22–29)
NT-PROBNP SERPL-MCNC: 1989 PG/ML (ref 0–1800)
POTASSIUM SERPL-SCNC: 4 MMOL/L (ref 3.4–5.3)
SODIUM SERPL-SCNC: 139 MMOL/L (ref 135–145)

## 2025-04-30 PROCEDURE — 36415 COLL VENOUS BLD VENIPUNCTURE: CPT | Performed by: PATHOLOGY

## 2025-04-30 PROCEDURE — 80048 BASIC METABOLIC PNL TOTAL CA: CPT | Performed by: PATHOLOGY

## 2025-04-30 PROCEDURE — G0463 HOSPITAL OUTPT CLINIC VISIT: HCPCS | Performed by: INTERNAL MEDICINE

## 2025-04-30 PROCEDURE — 83880 ASSAY OF NATRIURETIC PEPTIDE: CPT | Performed by: PATHOLOGY

## 2025-04-30 ASSESSMENT — PAIN SCALES - GENERAL: PAINLEVEL_OUTOF10: NO PAIN (0)

## 2025-04-30 NOTE — PATIENT INSTRUCTIONS
Dr. Hyatt recommends:    Stop taking Chlorthalidone.    Start taking Jardiance 10 MG once daily.    Follow up clinic visit with Dr. Hyatt in 5 months. OK to use 7:00 AM slot per Dr. Hyatt. No labs needed.    Thank you for your visit today.  Please call me with any questions or concerns.   Lucien Mesa RN  Cardiology Care Coordinator  [phone]

## 2025-04-30 NOTE — NURSING NOTE
"Chief Complaint   Patient presents with    Follow Up     Dr. Hyatt: HF, LERMA, CAD, HTN, NICM      BP Readings from Last 1 Encounters:   04/30/25 (!) 157/86      Pulse Readings from Last 1 Encounters:   04/30/25 74      Ht Readings from Last 2 Encounters:   03/30/25 1.676 m (5' 6\")   11/19/24 1.676 m (5' 6\")      Wt Readings from Last 2 Encounters:   04/30/25 76.2 kg (168 lb)   03/30/25 77.1 kg (170 lb)      Body mass index is 27.12 kg/m .    Vitals were taken, medications reconciled.     Melvin Clarke, Clinic Assistant    9:21 AM    "
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
## 2025-07-22 ENCOUNTER — TELEPHONE (OUTPATIENT)
Dept: CARDIOLOGY | Facility: CLINIC | Age: 89
End: 2025-07-22
Payer: COMMERCIAL

## 2025-07-22 NOTE — TELEPHONE ENCOUNTER
Left Voicemail (1st Attempt) and Sent Mychart (1st Attempt) for the patient to call back and schedule the following:    Appointment type: Return Cardiology  Provider: Dr. Hyatt  Return date: next available  Specialty phone number: [phone] Opt 1  Additional appointment(s) needed: NA  Additonal Notes: r/s from 9/3 Edmar appt

## (undated) DEVICE — INTRODUCER SHEATH GREEN 6.5FRX11CM .038IN PSI-6F-11-038ACT

## (undated) DEVICE — INTRO SHEATH 7FRX10CM PINNACLE RSS702

## (undated) DEVICE — INTRO SHEATH 4FRX10CM PINNACLE RSS402

## (undated) DEVICE — TOTE ANGIO CORP PC15AT SAN32CC83O

## (undated) DEVICE — NDL PERC ENTRY THINWALL 18GA 7.0" G00166

## (undated) DEVICE — CATH DIAG 4FR JL 4.5 538417

## (undated) DEVICE — TUBE SET SMARKABLATE IRRIGATION

## (undated) DEVICE — PATCH CARTO 3 EXTERNAL REFERENCE 3D MAPPING CREFP6

## (undated) DEVICE — CATH DIAG 4FR ANG PIG 538453S

## (undated) DEVICE — DEFIB PRO-PADZ LVP LQD GEL ADULT 8900-2105-01

## (undated) DEVICE — PACK EP SRG PROC LF DISP SAN32EPFSR

## (undated) DEVICE — CATH GUIDING BLUE YELLOW PTFE XB3.5 6FRX100CM 67005400

## (undated) DEVICE — RAD INTRODUCER KIT MICRO 5FRX10CM .018 NITINOL G/W

## (undated) DEVICE — CATH THERMOCOOL SMARTTOUCH SF FJ CURVE

## (undated) DEVICE — INTRO SHEALTH 8.5FRX63CM SRO 406853

## (undated) DEVICE — CATH BLAZER DX-20 DUO-DECAPOLA

## (undated) DEVICE — CATH EP 6FR 2MM TIP 2-8-2 115C

## (undated) DEVICE — CATH ANGIO INFINITI 3DRC 4FRX100CM 538476

## (undated) DEVICE — GW VASC OMNIWIRE J L185CM PRESSURE 89185J

## (undated) DEVICE — KIT HAND CONTROL ANGIOTOUCH ACIST 65CM AT-P65

## (undated) RX ORDER — FENTANYL CITRATE 50 UG/ML
INJECTION, SOLUTION INTRAMUSCULAR; INTRAVENOUS
Status: DISPENSED
Start: 2024-10-21

## (undated) RX ORDER — HEPARIN SODIUM 1000 [USP'U]/ML
INJECTION, SOLUTION INTRAVENOUS; SUBCUTANEOUS
Status: DISPENSED
Start: 2024-04-18

## (undated) RX ORDER — FENTANYL CITRATE 50 UG/ML
INJECTION, SOLUTION INTRAMUSCULAR; INTRAVENOUS
Status: DISPENSED
Start: 2024-04-18

## (undated) RX ORDER — LIDOCAINE HYDROCHLORIDE 40 MG/ML
SOLUTION TOPICAL
Status: DISPENSED
Start: 2024-10-21

## (undated) RX ORDER — HEPARIN SODIUM 200 [USP'U]/100ML
INJECTION, SOLUTION INTRAVENOUS
Status: DISPENSED
Start: 2024-10-21

## (undated) RX ORDER — HEPARIN SODIUM 200 [USP'U]/100ML
INJECTION, SOLUTION INTRAVENOUS
Status: DISPENSED
Start: 2024-04-18

## (undated) RX ORDER — FLUMAZENIL 0.1 MG/ML
INJECTION, SOLUTION INTRAVENOUS
Status: DISPENSED
Start: 2024-10-21

## (undated) RX ORDER — LIDOCAINE HYDROCHLORIDE 10 MG/ML
INJECTION, SOLUTION EPIDURAL; INFILTRATION; INTRACAUDAL; PERINEURAL
Status: DISPENSED
Start: 2024-10-21

## (undated) RX ORDER — GLYCOPYRROLATE 0.2 MG/ML
INJECTION, SOLUTION INTRAMUSCULAR; INTRAVENOUS
Status: DISPENSED
Start: 2024-10-21

## (undated) RX ORDER — KETOROLAC TROMETHAMINE 30 MG/ML
INJECTION, SOLUTION INTRAMUSCULAR; INTRAVENOUS
Status: DISPENSED
Start: 2024-10-21

## (undated) RX ORDER — LIDOCAINE HYDROCHLORIDE 10 MG/ML
INJECTION, SOLUTION EPIDURAL; INFILTRATION; INTRACAUDAL; PERINEURAL
Status: DISPENSED
Start: 2024-04-18

## (undated) RX ORDER — NALOXONE HYDROCHLORIDE 0.4 MG/ML
INJECTION, SOLUTION INTRAMUSCULAR; INTRAVENOUS; SUBCUTANEOUS
Status: DISPENSED
Start: 2024-10-21